# Patient Record
Sex: MALE | Race: WHITE | Employment: FULL TIME | URBAN - METROPOLITAN AREA
[De-identification: names, ages, dates, MRNs, and addresses within clinical notes are randomized per-mention and may not be internally consistent; named-entity substitution may affect disease eponyms.]

---

## 2017-08-11 ENCOUNTER — OFFICE VISIT (OUTPATIENT)
Dept: INTERNAL MEDICINE CLINIC | Age: 42
End: 2017-08-11

## 2017-08-11 VITALS
WEIGHT: 161.4 LBS | BODY MASS INDEX: 23.11 KG/M2 | HEIGHT: 70 IN | HEART RATE: 76 BPM | TEMPERATURE: 98.5 F | DIASTOLIC BLOOD PRESSURE: 82 MMHG | SYSTOLIC BLOOD PRESSURE: 127 MMHG | OXYGEN SATURATION: 96 %

## 2017-08-11 DIAGNOSIS — K64.9 HEMORRHOIDS, UNSPECIFIED HEMORRHOID TYPE: ICD-10-CM

## 2017-08-11 DIAGNOSIS — F41.9 ANXIETY: Primary | ICD-10-CM

## 2017-08-11 DIAGNOSIS — E05.90 HYPERTHYROIDISM: ICD-10-CM

## 2017-08-11 RX ORDER — ASPIRIN 325 MG
325 TABLET ORAL DAILY
COMMUNITY
End: 2017-09-06

## 2017-08-11 RX ORDER — HYDROCORTISONE 25 MG/G
CREAM TOPICAL 4 TIMES DAILY
Qty: 30 G | Refills: 0 | Status: SHIPPED | OUTPATIENT
Start: 2017-08-11 | End: 2018-02-01

## 2017-08-11 NOTE — PROGRESS NOTES
Chief Complaint   Patient presents with    Hemorrhoids     x 2 months    Anxiety     x 3 weeks    Thyroid Problem     Hyperthyroidism     he is a 46y.o. year old male who presents for evaluation of anxiety x 3 weeks. Anxiety and/or Depression  Well controlled on none and no other therapies. Ongoing symptoms include: shortness of breath, trouble focusing,   Patient denies: chest pain. Reported side effects from the treatment: none. Patient also states he has got recurrent hemorrhoids. He does not have a current flare, but has been taking fiber supplements with Gummies and trying over-the-counter creams with no relief. States he has these flareups every couple weeks and has a history of having a hemorrhoidectomy he had a colonoscopy done 6 years ago which was normal  Reviewed and agree with Nurse Note and duplicated in this note. Reviewed PmHx, RxHx, FmHx, SocHx, AllgHx and updated and dated in the chart.     Family History   Problem Relation Age of Onset    Thyroid Disease Mother      hyperthyroidism    Hypertension Father     Thyroid Disease Maternal Grandmother        Past Medical History:   Diagnosis Date    Fatigue 2/20/2012    Prostatitis       Social History     Social History    Marital status:      Spouse name: N/A    Number of children: N/A    Years of education: N/A     Social History Main Topics    Smoking status: Never Smoker    Smokeless tobacco: Never Used    Alcohol use Yes      Comment: 4-5 drinks per week    Drug use: No    Sexual activity: Yes     Partners: Female     Birth control/ protection: Surgical     Other Topics Concern    Not on file     Social History Narrative    No narrative on file        Review of Systems - negative except as listed above      Objective:     Vitals:    08/11/17 0942   BP: 127/82   Pulse: 76   Temp: 98.5 °F (36.9 °C)   TempSrc: Oral   SpO2: 96%   Weight: 161 lb 6.4 oz (73.2 kg)   Height: 5' 10\" (1.778 m)       Physical Examination: General appearance - alert, well appearing, and in no distress  Eyes - pupils equal and reactive, extraocular eye movements intact  Ears - bilateral TM's and external ear canals normal  Nose - normal and patent, no erythema, discharge or polyps  Mouth - mucous membranes moist, pharynx normal without lesions  Neck - supple, no significant adenopathy  Chest - clear to auscultation, no wheezes, rales or rhonchi, symmetric air entry  Heart - normal rate, regular rhythm, normal S1, S2, no murmurs, rubs, clicks or gallops  Abdomen - soft, nontender, nondistended, no masses or organomegaly  Neurological - alert, oriented, normal speech, no focal findings or movement disorder noted  Musculoskeletal - no joint tenderness, deformity or swelling  Extremities - peripheral pulses normal, no pedal edema, no clubbing or cyanosis  Skin - normal coloration and turgor, no rashes, no suspicious skin lesions noted    Assessment/ Plan:   Diagnoses and all orders for this visit:    1. Anxiety  -     REFERRAL TO PSYCHOLOGY    2. Hyperthyroidism  -     TSH 3RD GENERATION  -     T4, FREE  -     T3 TOTAL    3. Hemorrhoids, unspecified hemorrhoid type  -     hydrocortisone (ANUSOL-HC) 2.5 % rectal cream; Insert  into rectum four (4) times daily. Follow-up Disposition:  Return if symptoms worsen or fail to improve. I have discussed the diagnosis with the patient and the intended plan as seen in the above orders. The patient has received an after-visit summary and questions were answered concerning future plans. Medication Side Effects and Warnings were discussed with patient: yes  Patient Labs were reviewed and or requested: yes  Patient Past Records were reviewed and or requested  yes  I have discussed the diagnosis with the patient and the intended plan as seen in the above orders. The patient has received an after-visit summary and questions were answered concerning future plans.      Pt agrees to call or return to clinic and/or go to closest ER with any worsening of symptoms. This may include, but not limited to increased fever (>100.4) with NSAIDS or Tylenol, increased edema, confusion, rash, worsening of presenting symptoms. 1) Remember to stay active and/or exercise regularly (I suggest 30-45 minutes daily)   2) For reliable dietary information, go to www. EATRIGHT.org. You may wish to consider seeing the nutritionist at [facility] at #[phone] or [phone], also consider the [phone] [facility].   3) I routinely suggest a complete physical exam once each year (your birth month)

## 2017-08-11 NOTE — MR AVS SNAPSHOT
Visit Information Date & Time Provider Department Dept. Phone Encounter #  
 8/11/2017  9:30 AM Hunter Jones MD [facility] Medicine and [place] [id number] Follow-up Instructions Return if symptoms worsen or fail to improve. Follow-up and Disposition History Upcoming Health Maintenance Date Due DTaP/Tdap/Td series (1 - Tdap) 9/10/1998 INFLUENZA AGE 9 TO ADULT 8/1/2017 Allergies as of 8/11/2017  Review Complete On: 8/11/2017 By: Hunter Jones MD  
  
 Severity Noted Reaction Type Reactions Hydrocodone  02/20/2012   Side Effect Nausea and Vomiting Current Immunizations  Never Reviewed No immunizations on file. Not reviewed this visit You Were Diagnosed With   
  
 Codes Comments Anxiety    -  Primary ICD-10-CM: F41.9 ICD-9-CM: 300.00 Hyperthyroidism     ICD-10-CM: E05.90 ICD-9-CM: 242.90 Hemorrhoids, unspecified hemorrhoid type     ICD-10-CM: K64.9 ICD-9-CM: 084. 6 Vitals BP Pulse Temp Height(growth percentile) Weight(growth percentile) SpO2  
 127/82 (BP 1 Location: Left arm, BP Patient Position: Sitting) 76 98.5 °F (36.9 °C) (Oral) 5' 10\" (1.778 m) 161 lb 6.4 oz (73.2 kg) 96% BMI Smoking Status 23.16 kg/m2 Never Smoker Vitals History BMI and BSA Data Body Mass Index Body Surface Area  
 23.16 kg/m 2 1.9 m 2 Preferred Pharmacy Pharmacy Name Phone [id number] [facility], [address] [place] [address] Your Updated Medication List  
  
   
This list is accurate as of: 8/11/17 10:26 AM.  Always use your most recent med list.  
  
  
  
  
 aspirin 325 mg tablet Commonly known as:  ASPIRIN Take 325 mg by mouth daily. hydrocortisone 2.5 % rectal cream  
Commonly known as:  ANUSOL-HC Insert  into rectum four (4) times daily. zolpidem 10 mg tablet Commonly known as:  AMBIEN  
 Take 1 Tab by mouth nightly as needed for Sleep. Prescriptions Sent to Pharmacy Refills  
 hydrocortisone (ANUSOL-HC) 2.5 % rectal cream 0 Sig: Insert  into rectum four (4) times daily. Class: Normal  
 [facility]acy: [facility] [address], [address] [place] [address] #: [phone] Route: Rectal  
  
We Performed the Following REFERRAL TO PSYCHOLOGY [MGI07 Custom] Comments:  
 Please evaluate patient for anxiety. T3 TOTAL [id number] CPT(R)] T4, FREE U735379 CPT(R)] TSH 3RD GENERATION [47421 CPT(R)] Follow-up Instructions Return if symptoms worsen or fail to improve. Referral Information Referral ID Referred By Referred To  
  
 [id number] Gilma HAM Not Available Visits Status Start Date End Date 1 New Request 8/11/17 8/11/18 If your referral has a status of pending review or denied, additional information will be sent to support the outcome of this decision. Patient Instructions Metamucil daily Tucks pads after each bowel movement Introducing [facility] & HEALTH SERVICES! Dear Leah Choudhury: Thank you for requesting a reBounces account. Our records indicate that you have previously registered for a reBounces account but its currently inactive. Please call our reBounces support line at [phone]. Additional Information If you have questions, please visit the Frequently Asked Questions section of the reBounces website at [email or web address] Remember, reBounces is NOT to be used for urgent needs. For medical emergencies, dial 911. Now available from your iPhone and Android! Please provide this summary of care documentation to your next provider. Your primary care clinician is listed as Tonia Hastings. If you have any questions after today's visit, please call [phone].

## 2017-08-12 DIAGNOSIS — E05.90 HYPERTHYROIDISM: Primary | ICD-10-CM

## 2017-08-12 LAB
T3 SERPL-MCNC: 125 NG/DL (ref 71–180)
T4 FREE SERPL-MCNC: 1.54 NG/DL (ref 0.82–1.77)
TSH SERPL DL<=0.005 MIU/L-ACNC: 0.41 UIU/ML (ref 0.45–4.5)

## 2017-08-16 ENCOUNTER — TELEPHONE (OUTPATIENT)
Dept: INTERNAL MEDICINE CLINIC | Age: 42
End: 2017-08-16

## 2017-08-16 NOTE — TELEPHONE ENCOUNTER
----- Message from Milo Links sent at 8/15/2017  9:18 AM EDT -----  The patient is requesting a call back regarding his thyroid test results. Thanks!

## 2017-08-16 NOTE — TELEPHONE ENCOUNTER
This writer contact patient in reference to recent lab results. This writer informed patient per result letter, per physician thyroid level (TSH) was slightly low and per physician recommendation advised patient physician will submitted a referral to endocrinology for further evaluation. Patient verbalized understanding and states he has an appointment scheduled with endocrine in October. No further questions or concerns voiced.

## 2017-09-06 ENCOUNTER — OFFICE VISIT (OUTPATIENT)
Dept: ENDOCRINOLOGY | Age: 42
End: 2017-09-06

## 2017-09-06 VITALS
HEART RATE: 67 BPM | SYSTOLIC BLOOD PRESSURE: 113 MMHG | WEIGHT: 165 LBS | DIASTOLIC BLOOD PRESSURE: 78 MMHG | HEIGHT: 70 IN | BODY MASS INDEX: 23.62 KG/M2

## 2017-09-06 DIAGNOSIS — E05.90 SUBCLINICAL HYPERTHYROIDISM: Primary | ICD-10-CM

## 2017-09-06 RX ORDER — HYDROCORTISONE 25 MG/G
CREAM TOPICAL
Refills: 0 | COMMUNITY
Start: 2017-08-12 | End: 2017-09-06

## 2017-09-06 NOTE — MR AVS SNAPSHOT
Visit Information Date & Time Provider Department Dept. Phone Encounter #  
 9/6/2017  9:50 AM Ragini Escamilla, [facility] [phone] [id number] Follow-up Instructions Return in about 3 months (around 12/6/2017). Upcoming Health Maintenance Date Due DTaP/Tdap/Td series (1 - Tdap) 9/10/1998 INFLUENZA AGE 9 TO ADULT 8/1/2017 Allergies as of 9/6/2017  Review Complete On: 9/6/2017 By: Ragini Escamilla MD  
  
 Severity Noted Reaction Type Reactions Hydrocodone  02/20/2012   Side Effect Nausea and Vomiting Current Immunizations  Never Reviewed No immunizations on file. Not reviewed this visit You Were Diagnosed With   
  
 Codes Comments Subclinical hyperthyroidism    -  Primary ICD-10-CM: E05.90 ICD-9-CM: 242.90 Vitals BP Pulse Height(growth percentile) Weight(growth percentile) BMI Smoking Status 113/78 67 5' 10\" (1.778 m) 165 lb (74.8 kg) 23.68 kg/m2 Never Smoker Vitals History BMI and BSA Data Body Mass Index Body Surface Area  
 23.68 kg/m 2 1.92 m 2 Preferred Pharmacy Pharmacy Name Phone [id number], [place] - [address] [phone] Your Updated Medication List  
  
   
This list is accurate as of: 9/6/17 10:33 AM.  Always use your most recent med list.  
  
  
  
  
 hydrocortisone 2.5 % rectal cream  
Commonly known as:  ANUSOL-HC Insert  into rectum four (4) times daily. zolpidem 10 mg tablet Commonly known as:  AMBIEN Take 1 Tab by mouth nightly as needed for Sleep. We Performed the Following NE COLLECTION VENOUS BLOOD,VENIPUNCTURE L3801304 CPT(R)] SPECIMEN HANDLING, OFF->LAB [id number] CPT(R)] THYROID ANTIBODY PANEL [12406 CPT(R)] THYROID STIMULATING IMMUNOGLOBULIN K3426435 CPT(R)] TSH RECEPTOR AB [72476 CPT(R)] Follow-up Instructions Return in about 3 months (around 12/6/2017). Patient Instructions Hyperthyroidism: Care Instructions Your Care Instructions Hyperthyroidism occurs when the thyroid gland makes too much thyroid hormone. This speeds up your metabolismhow your body uses energy. This condition can cause you to be very active, lose weight, and have sleep problems, eye problems, and a fast heart rate. It can also cause a goiter. A goiter is an enlarged thyroid gland that you can see at the front of the neck. Hyperthyroidism is often caused by Graves disease. In Graves' disease, the body's defense (immune) system attacks the thyroid gland. Your doctor may prescribe a beta-blocker medicine to slow your pulse and calm you down. But this is not a treatment for hyperthyroidism. It is given for your fast heart rate. Your doctor may also give you antithyroid medicine. This medicine keeps excess thyroid hormone in check. In some cases, doctors recommend radioactive iodine or surgery to remove the thyroid. After either of these treatments, you may need to take medicine to replace thyroid hormone for the rest of your life. Follow-up care is a key part of your treatment and safety. Be sure to make and go to all appointments, and call your doctor if you are having problems. Its also a good idea to know your test results and keep a list of the medicines you take. How can you care for yourself at home? · Take your medicines exactly as prescribed. You need to take the thyroid medicine at the same time each day. Call your doctor if you think you are having a problem with your medicine. · Graves' disease can make your eyes sore. Use artificial tears, eye drops, and sunglasses to protect your eyes from dryness, wind, and sun. Raise your head with pillows at night to prevent your eyes from swelling. In some cases, taping your eyelids shut at night will keep your eyes from being dry in the morning. · Make sure you get enough calcium. Foods that are rich in calcium include milk, yogurt, cheese, and dark green vegetables. · If you need to gain weight, ask your doctor about special diets. · Do not eat kelp. Gwendlyn Areola is high in iodine, which can make hyperthyroidism worse. Gwendlyn Areola is commonly used in Electrochaea and other [employer] foods. You can use iodized salt and eat bread and seafood. Try to eat a balanced diet. · Do not use caffeine and other stimulants. These can make symptoms worse, such as a fast heartbeat, nervousness, and problems focusing. · Do not smoke. Smoking can make your condition worse and may lead to more serious eye problems. If you need help quitting, talk to your doctor about stop-smoking programs and medicines. These can increase your chances of quitting for good. · Lower your stress. Learn to use biofeedback, guided imagery, meditation, or other methods to relax. · Use creams or ointments for irritated skin. Ask your doctor which type to use. · Tell all your doctors about your condition. They need to know because some medicines contain iodine. When should you call for help? Call your doctor now or seek immediate medical care if: 
· You have symptoms of a sudden, very high thyroid level (thyroid storm). These include: ¨ Being nauseated, vomiting, and having diarrhea. ¨ Sweating a lot. ¨ Feeling extremely restless and confused. ¨ Having a high fever. ¨ Having a fast heartbeat. · You have sudden vision changes or eye pain. · You have a fever or severe sore throat and are taking antithyroid medicines, such as PTU or methimazole. Watch closely for changes in your health, and be sure to contact your doctor if: 
· You have a sore throat or have problems swallowing. · You have swollen, itchy, or red eyes or your other eye symptoms get worse, or you have new vision problems. · You have signs of a low thyroid level (hypothyroidism). You may feel very tired, confused, or weak. Where can you learn more? Go to http://taylor-ki.info/. Enter K833 in the search box to learn more about \"Hyperthyroidism: Care Instructions. \" Current as of: July 28, 2016 Content Version: 11.3 © 2326-4252 Smart Device Media, Incorporated. Care instructions adapted under license by An[employer] (which disclaims liability or warranty for this information). If you have questions about a medical condition or this instruction, always ask your healthcare professional. [address] any warranty or liability for your use of this information. Introducing [facility] & HEALTH SERVICES! Dear Janessa Rain: Thank you for requesting a Stocard account. Our records indicate that you already have an active Stocard account. You can access your account anytime at [email or web address] Did you know that you can access your hospital and ER discharge instructions at any time in Stocard? You can also review all of your test results from your hospital stay or ER visit. Additional Information If you have questions, please visit the Frequently Asked Questions section of the Stocard website at [email or web address] Remember, Stocard is NOT to be used for urgent needs. For medical emergencies, dial 911. Now available from your iPhone and Android! Please provide this summary of care documentation to your next provider. Your primary care clinician is listed as [address] [place] [address] . If you have any questions after today's visit, please call [phone].

## 2017-09-06 NOTE — PATIENT INSTRUCTIONS
Hyperthyroidism: Care Instructions  Your Care Instructions  Hyperthyroidism occurs when the thyroid gland makes too much thyroid hormone. This speeds up your metabolism--how your body uses energy. This condition can cause you to be very active, lose weight, and have sleep problems, eye problems, and a fast heart rate. It can also cause a goiter. A goiter is an enlarged thyroid gland that you can see at the front of the neck. Hyperthyroidism is often caused by Graves disease. In Graves' disease, the body's defense (immune) system attacks the thyroid gland. Your doctor may prescribe a beta-blocker medicine to slow your pulse and calm you down. But this is not a treatment for hyperthyroidism. It is given for your fast heart rate. Your doctor may also give you antithyroid medicine. This medicine keeps excess thyroid hormone in check. In some cases, doctors recommend radioactive iodine or surgery to remove the thyroid. After either of these treatments, you may need to take medicine to replace thyroid hormone for the rest of your life. Follow-up care is a key part of your treatment and safety. Be sure to make and go to all appointments, and call your doctor if you are having problems. Its also a good idea to know your test results and keep a list of the medicines you take. How can you care for yourself at home? · Take your medicines exactly as prescribed. You need to take the thyroid medicine at the same time each day. Call your doctor if you think you are having a problem with your medicine. · Graves' disease can make your eyes sore. Use artificial tears, eye drops, and sunglasses to protect your eyes from dryness, wind, and sun. Raise your head with pillows at night to prevent your eyes from swelling. In some cases, taping your eyelids shut at night will keep your eyes from being dry in the morning. · Make sure you get enough calcium.  Foods that are rich in calcium include milk, yogurt, cheese, and dark green vegetables. · If you need to gain weight, ask your doctor about special diets. · Do not eat kelp. Jeny Tripathi is high in iodine, which can make hyperthyroidism worse. Jeny Tripathi is commonly used in [employer] and other [place] foods. You can use iodized salt and eat bread and seafood. Try to eat a balanced diet. · Do not use caffeine and other stimulants. These can make symptoms worse, such as a fast heartbeat, nervousness, and problems focusing. · Do not smoke. Smoking can make your condition worse and may lead to more serious eye problems. If you need help quitting, talk to your doctor about stop-smoking programs and medicines. These can increase your chances of quitting for good. · Lower your stress. Learn to use biofeedback, guided imagery, meditation, or other methods to relax. · Use creams or ointments for irritated skin. Ask your doctor which type to use. · Tell all your doctors about your condition. They need to know because some medicines contain iodine. When should you call for help? Call your doctor now or seek immediate medical care if:  · You have symptoms of a sudden, very high thyroid level (thyroid storm). These include:  ¨ Being nauseated, vomiting, and having diarrhea. ¨ Sweating a lot. ¨ Feeling extremely restless and confused. ¨ Having a high fever. ¨ Having a fast heartbeat. · You have sudden vision changes or eye pain. · You have a fever or severe sore throat and are taking antithyroid medicines, such as PTU or methimazole. Watch closely for changes in your health, and be sure to contact your doctor if:  · You have a sore throat or have problems swallowing. · You have swollen, itchy, or red eyes or your other eye symptoms get worse, or you have new vision problems. · You have signs of a low thyroid level (hypothyroidism). You may feel very tired, confused, or weak. Where can you learn more? Go to http://taylor-ki.info/.   Enter I271 in the search box to learn more about \"Hyperthyroidism: Care Instructions. \"  Current as of: July 28, 2016  Content Version: 11.3  © 1725-5208 Monitor My Meds, Leapforce. Care instructions adapted under license by Blue Rooster (which disclaims liability or warranty for this information). If you have questions about a medical condition or this instruction, always ask your healthcare professional. [place] [address] any warranty or liability for your use of this information.

## 2017-09-06 NOTE — LETTER
9/6/2017 10:26 AM 
 
Patient:  Tammy Mackenzie YOB: 1975 Date of Visit: 9/6/2017 Dear Zipporah Lanes, MD 
[facility] [address] [id number] VIA In Basket 
 : Thank you for referring Mr. Tammy Mackenzie to me for evaluation/treatment. Below are the relevant portions of my assessment and plan of care. If you have questions, please do not hesitate to call me. I look forward to following Mr. Emani Rodriguez along with you. Sincerely, Elvia Harvey MD

## 2017-09-06 NOTE — PROGRESS NOTES
Chief Complaint   Patient presents with    Thyroid Problem     pcp and pharmacy verified   Records reviewed  History of Present Illness: Lesly Suarez is a 46 y.o. male who I was asked to see in consult by Dr. Apoorva Campoverde, for evaluation of hyperthyroidism. He was originally diagnosed with \"subclinical Hyperthyroidism\" 4-5 years ago and in 2013 he was seen by my partner Dr. Mckenzie Kemp. He was never started on MMI or anti-thyroid medications at the time as he was asymptomatic. He had a normal thyroid US and his U&S was 30% with no hot or cold nodules. Pt notes he has a strong family hx of thyroid issues. He note his mother had \"hyperthyroidism till her thyroid gland gave up and she become hypothyroid\". His MGM was never diagnosed till she was near death but she was diagnosed with \"thyroid issues\" and his MUncle had hyperthyroidism as well. Pt has no personal hx of cancer, his PGM had \"cancer of some kind\". In August he presented to his PCP with issues of anxiety for about 3 weeks and as part of the work-up he was found to have TSH of 0.414, FT4 of 1.54 and TT3 of 125. He reports he does get issues of palpitations, about once per month, he denies CP or SOB. \"I can't hold my hands still to save my life, but I have that this my entire adult life\". He notes issues of constipation. He notes \"I am cold natured\". He denies any issues of weight change up or down. He denies issues of changes in his hair, nails, skin or neck. He denies issues of dysphagia, dysphonia or chocking issues.       Past Medical History:   Diagnosis Date    Fatigue 2/20/2012    Prostatitis      Past Surgical History:   Procedure Laterality Date    HX ADENOIDECTOMY      HX HERNIA REPAIR  2008    HX RHINOPLASTY      deviated septum    HX VASECTOMY  2002    MO COLONOSCOPY FLX DX W/COLLJ SPEC WHEN PFRMD  2011    repeat age 50     Current Outpatient Prescriptions   Medication Sig    hydrocortisone (ANUSOL-HC) 2.5 % rectal cream Insert  into rectum four (4) times daily.  zolpidem (AMBIEN) 10 mg tablet Take 1 Tab by mouth nightly as needed for Sleep. No current facility-administered medications for this visit. Allergies   Allergen Reactions    Hydrocodone Nausea and Vomiting     Family History   Problem Relation Age of Onset    Thyroid Disease Mother      hyperthyroidism    Diabetes Mother     Seizures Mother     Hypertension Father     Heart Disease Father     Psychiatric Disorder Brother     No Known Problems Brother     Thyroid Disease Maternal Grandmother     Thyroid Disease Maternal Uncle     Cancer Paternal Grandmother      Unknown     Social History     Social History    Marital status:      Spouse name: N/A    Number of children: N/A    Years of education: N/A     Occupational History    Not on file. Social History Main Topics    Smoking status: Never Smoker    Smokeless tobacco: Never Used    Alcohol use Yes      Comment: 2-4 per week    Drug use: No    Sexual activity: Yes     Partners: Female     Birth control/ protection: Surgical     Other Topics Concern    Not on file     Social History Narrative     Review of Systems:  - Constitutional Symptoms: no fevers, chills, weight loss  - Eyes: no blurry vision or double vision  - Cardiovascular: no chest pain or palpitations  - Respiratory: no cough or shortness of breath  - Gastrointestinal: no dysphagia or abdominal pain  - Musculoskeletal: no joint pains or weakness  - Integumentary: no rashes  - Neurological: no numbness, tingling, or headaches  - Psychiatric: no depression or anxiety  - Endocrine: no heat or cold intolerance, no polyuria or polydipsia    Physical Examination:  Blood pressure 113/78, pulse 67, height 5' 10\" (1.778 m), weight 165 lb (74.8 kg).   - General: pleasant, no distress, good eye contact  - HEENT: no exopthalmos, no periorbital edema, no scleral/conjunctival injection, EOMI, no lid lag or stare  - Neck: supple, no thyromegaly, masses, lymph nodes, or carotid bruits, no supraclavicular or dorsocervical fat pads  - Cardiovascular: regular, normal rate, normal S1 and S2, no murmurs/rubs/gallops   - Respiratory: clear to auscultation bilaterally  - Gastrointestinal: soft, nontender, nondistended, no masses, no hepatosplenomegaly  - Musculoskeletal: no proximal muscle weakness in upper or lower extremities  - Integumentary: no acanthosis nigricans, no abdominal striae, no rashes, no edema  - Neurological: reflexes 2+ at biceps, no tremor  - Psychiatric: normal mood and affect    Data Reviewed:   REPORT:  EXAM:  THYROID PARATHYROID - US    INDICATION:    hyperthyroidism     COMPARISON: None. FINDINGS: Real-time sonography of the thyroid gland was performed with a   high frequency linear transducer. Multiple static images were obtained. The thyroid is homogeneous in echotexture with no mass, nodule or other   abnormality. The right lobe measures 6.4 x 2.3 x 2.2 cm and the left lobe measures 6.4 x   2.1 x 2.1cm. The isthmus measures 4.8 mm.        IMPRESSION:  Normal ultrasound examination of the thyroid. REPORT:  EXAM: Nuclear thyroid scan and uptake are obtained 24 hours following p.o.   administration of 297 uCi Iodine 123. INDICATION:  Thyrotoxicosis without mention  Mild hyperthyroidism and   questionable asymmetry of gland     FINDINGS: Uptake is 30% (normal: 10% - 30%). Images of the gland in 3 projections show symmetric appearance and uniform   uptake with no hot or cold nodules.        IMPRESSION:   1. Top normal 24 uptake. 2. No hot or cold nodules.      Component      Latest Ref Rng & Units 8/11/2017 8/11/2017 8/11/2017          10:19 AM 10:19 AM 10:19 AM   TSH      0.450 - 4.500 uIU/mL   0.414 (L)   T4, Free      0.82 - 1.77 ng/dL  1.54    T3, total      71 - 180 ng/dL 125         Assessment/Plan:   1.  Subclinical hyperthyroidism    1) In August his TSH was mildly suppressed, but this is how his labs looked in 2012 as well. My partner Dr. Regulo Knight previously checked Thyroid US, which was normal and he had a borderline high U&S. Today will check TSI, TRAb, and Thyroid Ab levels to look for an etiology of his SH. Pt has symptoms of occasional insomnia and anxiety, which could be due to hyperthyroidism, or could be unrelated. We discussed treatment options including MMI, BETANCOURT and thyroidectomy. For now I don't feel pt need treatment/therapy and we can monitor his levels only. If pt's symptoms of anxiety and insomnia worsen or if he develops other symptoms of hyperthyroidism the I feel he would warrant therapy. I am not going to repeat the US or U&S at this time, but there is a clinical change or his TFTs change then I feel repeating the U&S would be warranted. Pt voices understanding and agreement with the plan. RTC 3 months. Patient Instructions        Hyperthyroidism: Care Instructions  Your Care Instructions  Hyperthyroidism occurs when the thyroid gland makes too much thyroid hormone. This speeds up your metabolism--how your body uses energy. This condition can cause you to be very active, lose weight, and have sleep problems, eye problems, and a fast heart rate. It can also cause a goiter. A goiter is an enlarged thyroid gland that you can see at the front of the neck. Hyperthyroidism is often caused by Graves disease. In Graves' disease, the body's defense (immune) system attacks the thyroid gland. Your doctor may prescribe a beta-blocker medicine to slow your pulse and calm you down. But this is not a treatment for hyperthyroidism. It is given for your fast heart rate. Your doctor may also give you antithyroid medicine. This medicine keeps excess thyroid hormone in check. In some cases, doctors recommend radioactive iodine or surgery to remove the thyroid. After either of these treatments, you may need to take medicine to replace thyroid hormone for the rest of your life.   Follow-up care is a key part of your treatment and safety. Be sure to make and go to all appointments, and call your doctor if you are having problems. Its also a good idea to know your test results and keep a list of the medicines you take. How can you care for yourself at home? · Take your medicines exactly as prescribed. You need to take the thyroid medicine at the same time each day. Call your doctor if you think you are having a problem with your medicine. · Graves' disease can make your eyes sore. Use artificial tears, eye drops, and sunglasses to protect your eyes from dryness, wind, and sun. Raise your head with pillows at night to prevent your eyes from swelling. In some cases, taping your eyelids shut at night will keep your eyes from being dry in the morning. · Make sure you get enough calcium. Foods that are rich in calcium include milk, yogurt, cheese, and dark green vegetables. · If you need to gain weight, ask your doctor about special diets. · Do not eat kelp. Miah Divers is high in iodine, which can make hyperthyroidism worse. Miah Divers is commonly used in [employer] and other [employer] foods. You can use iodized salt and eat bread and seafood. Try to eat a balanced diet. · Do not use caffeine and other stimulants. These can make symptoms worse, such as a fast heartbeat, nervousness, and problems focusing. · Do not smoke. Smoking can make your condition worse and may lead to more serious eye problems. If you need help quitting, talk to your doctor about stop-smoking programs and medicines. These can increase your chances of quitting for good. · Lower your stress. Learn to use biofeedback, guided imagery, meditation, or other methods to relax. · Use creams or ointments for irritated skin. Ask your doctor which type to use. · Tell all your doctors about your condition. They need to know because some medicines contain iodine. When should you call for help?   Call your doctor now or seek immediate medical care if:  · You have symptoms of a sudden, very high thyroid level (thyroid storm). These include:  ¨ Being nauseated, vomiting, and having diarrhea. ¨ Sweating a lot. ¨ Feeling extremely restless and confused. ¨ Having a high fever. ¨ Having a fast heartbeat. · You have sudden vision changes or eye pain. · You have a fever or severe sore throat and are taking antithyroid medicines, such as PTU or methimazole. Watch closely for changes in your health, and be sure to contact your doctor if:  · You have a sore throat or have problems swallowing. · You have swollen, itchy, or red eyes or your other eye symptoms get worse, or you have new vision problems. · You have signs of a low thyroid level (hypothyroidism). You may feel very tired, confused, or weak. Where can you learn more? Go to http://taylor-ki.info/. Enter V737 in the search box to learn more about \"Hyperthyroidism: Care Instructions. \"  Current as of: July 28, 2016  Content Version: 11.3  © 8222-1872 [employer]. Care instructions adapted under license by [employer] (which disclaims liability or warranty for this information). If you have questions about a medical condition or this instruction, always ask your healthcare professional. [place] [address] any warranty or liability for your use of this information. Follow-up Disposition:  Return in about 3 months (around 12/6/2017).     Copy sent to:  Dr. Kyle Manriquez

## 2017-09-11 LAB
THYROGLOB AB SERPL-ACNC: <1 IU/ML (ref 0–0.9)
THYROPEROXIDASE AB SERPL-ACNC: 17 IU/ML (ref 0–34)
TSH RECEP AB SER-ACNC: <0.5 IU/L (ref 0–1.75)
TSI ACT/NOR SER: 31 % (ref 0–139)

## 2017-09-11 NOTE — PROGRESS NOTES
Thyroid TSI, TRAb and Thyroid Ab panel are all normal.  Pt is clinically euthyroid, I don't see any reasons to start him on an anti-thyroid regimen at this time

## 2017-12-29 ENCOUNTER — TELEPHONE (OUTPATIENT)
Dept: INTERNAL MEDICINE CLINIC | Age: 42
End: 2017-12-29

## 2017-12-29 DIAGNOSIS — K64.9 HEMORRHOIDS, UNSPECIFIED HEMORRHOID TYPE: Primary | ICD-10-CM

## 2017-12-29 NOTE — TELEPHONE ENCOUNTER
I called patient after I spoke with Dr. Dennise Harrison. Dr. Dennise Harrison is going to send in refills of hemorrhoid cream and refer him to a GI doctor to see if surgery is the next step. Patient agreed with plan.

## 2018-02-01 ENCOUNTER — OFFICE VISIT (OUTPATIENT)
Dept: ENDOCRINOLOGY | Age: 43
End: 2018-02-01

## 2018-02-01 VITALS
HEART RATE: 75 BPM | BODY MASS INDEX: 22.65 KG/M2 | SYSTOLIC BLOOD PRESSURE: 126 MMHG | HEIGHT: 70 IN | WEIGHT: 158.2 LBS | DIASTOLIC BLOOD PRESSURE: 76 MMHG

## 2018-02-01 DIAGNOSIS — E05.90 SUBCLINICAL HYPERTHYROIDISM: Primary | ICD-10-CM

## 2018-02-01 NOTE — PROGRESS NOTES
Chief Complaint   Patient presents with    Thyroid Problem     pcp and pharmacy verified   Records since last visit reviewed. History of Present Illness: Moises Dickinson is a 38 y.o. male here for follow up of low TSH with normal T3/T4. He was originally diagnosed with \"subclinical Hyperthyroidism\" 4-5 years ago and in 2013 he was seen by my partner Dr. Rosario Selby. He was never started on MMI or anti-thyroid medications at the time as he was asymptomatic. He had a normal thyroid US and his U&S was 30% with no hot or cold nodules. In August 2017 he presented to his PCP with issues of anxiety for about 3 weeks and as part of the work-up he was found to have TSH of 0.414, FT4 of 1.54 and TT3 of 125. In September 2017 I ordered TRAb, TPO, TgAb and TSI, all of which were negative. Since he was clinically euthyroid and had no obvious etiologies of hyperthyroidism I did not feel he warranted treatment for hyperthyroidism. \"I have been feeling a lot better. \" He notes his energy levels are much improved. He was recently diagnosed with an anal fissure and is being treated for this. He denies palpitations, CP or SOB. He is sleeping better and has cut back on etoh and caffiene. He notes issues of constipation. He notes \"I am cold natured\". He denies any issues of weight change up or down. He denies issues of changes in his hair, nails, skin or neck. He denies issues of dysphagia, dysphonia or chocking issues. Current Outpatient Prescriptions   Medication Sig    nitroglycerin (RECTIV) 0.4 % (w/w) ointment Insert  into rectum every twelve (12) hours every twelve (12) hours. No current facility-administered medications for this visit.       Allergies   Allergen Reactions    Hydrocodone Nausea and Vomiting    Levaquin [Levofloxacin] Other (comments)     Panic attacks     Review of Systems:  - Cardiovascular: no chest pain  - Neurological: no tremors  - Integumentary: skin is normal    Physical Examination:  Blood pressure 126/76, pulse 75, height 5' 10\" (1.778 m), weight 158 lb 3.2 oz (71.8 kg). - General: pleasant, no distress, good eye contact   - Neck: no thyromegaly or thyroid bruits  - Cardiovascular: regular, normal rate, nl s1 and s2, no m/r/g   - Integumentary: skin is normal, no edema  - Neurological: reflexes 2+ at biceps, no tremors  - Psychiatric: normal mood and affect    Data Reviewed:   - none new for review    Assessment/Plan:   1) Subclinical hyperthyroidism > Pt is clinically euthyroid. His previous symptoms have resolved. Will check TFTs today and if they are ok, he does not need further follow up with me at this time. Pt voices understanding and agreement with the plan.   Pain noted and pt was recommended to call his PCP for further evaluation and treatment, as needed    Copy sent to:  Dr. Katelyn Gaytan

## 2018-02-02 LAB
T3 SERPL-MCNC: 127 NG/DL (ref 71–180)
T4 FREE SERPL-MCNC: 1.71 NG/DL (ref 0.82–1.77)
TSH SERPL DL<=0.005 MIU/L-ACNC: 0.5 UIU/ML (ref 0.45–4.5)

## 2018-08-21 ENCOUNTER — ANESTHESIA EVENT (OUTPATIENT)
Dept: SURGERY | Age: 43
End: 2018-08-21
Payer: COMMERCIAL

## 2018-08-21 ENCOUNTER — ANESTHESIA (OUTPATIENT)
Dept: SURGERY | Age: 43
End: 2018-08-21
Payer: COMMERCIAL

## 2018-08-21 ENCOUNTER — HOSPITAL ENCOUNTER (OUTPATIENT)
Age: 43
Setting detail: OUTPATIENT SURGERY
Discharge: HOME | End: 2018-08-21
Attending: COLON & RECTAL SURGERY | Admitting: COLON & RECTAL SURGERY
Payer: COMMERCIAL

## 2018-08-21 VITALS
HEIGHT: 71 IN | DIASTOLIC BLOOD PRESSURE: 60 MMHG | SYSTOLIC BLOOD PRESSURE: 108 MMHG | OXYGEN SATURATION: 98 % | RESPIRATION RATE: 12 BRPM | BODY MASS INDEX: 23.1 KG/M2 | WEIGHT: 165 LBS | HEART RATE: 75 BPM | TEMPERATURE: 97.6 F

## 2018-08-21 DIAGNOSIS — G89.18 ACUTE POST-OPERATIVE PAIN: Primary | ICD-10-CM

## 2018-08-21 PROCEDURE — 77030031139 HC SUT VCRL2 J&J -A: Performed by: COLON & RECTAL SURGERY

## 2018-08-21 PROCEDURE — 77030026438 HC STYL ET INTUB CARD -A: Performed by: NURSE ANESTHETIST, CERTIFIED REGISTERED

## 2018-08-21 PROCEDURE — 77030032490 HC SLV COMPR SCD KNE COVD -B: Performed by: COLON & RECTAL SURGERY

## 2018-08-21 PROCEDURE — 76210000020 HC REC RM PH II FIRST 0.5 HR: Performed by: COLON & RECTAL SURGERY

## 2018-08-21 PROCEDURE — 77030008684 HC TU ET CUF COVD -B: Performed by: NURSE ANESTHETIST, CERTIFIED REGISTERED

## 2018-08-21 PROCEDURE — 77030020782 HC GWN BAIR PAWS FLX 3M -B

## 2018-08-21 PROCEDURE — 76210000017 HC OR PH I REC 1.5 TO 2 HR: Performed by: COLON & RECTAL SURGERY

## 2018-08-21 PROCEDURE — 77030013079 HC BLNKT BAIR HGGR 3M -A: Performed by: NURSE ANESTHETIST, CERTIFIED REGISTERED

## 2018-08-21 PROCEDURE — 76060000033 HC ANESTHESIA 1 TO 1.5 HR: Performed by: COLON & RECTAL SURGERY

## 2018-08-21 PROCEDURE — 74011250636 HC RX REV CODE- 250/636

## 2018-08-21 PROCEDURE — 74011250636 HC RX REV CODE- 250/636: Performed by: ANESTHESIOLOGY

## 2018-08-21 PROCEDURE — 74011000250 HC RX REV CODE- 250: Performed by: COLON & RECTAL SURGERY

## 2018-08-21 PROCEDURE — 76010000149 HC OR TIME 1 TO 1.5 HR: Performed by: COLON & RECTAL SURGERY

## 2018-08-21 PROCEDURE — 88304 TISSUE EXAM BY PATHOLOGIST: CPT | Performed by: COLON & RECTAL SURGERY

## 2018-08-21 PROCEDURE — 77030014007 HC SPNG HEMSTAT J&J -B: Performed by: COLON & RECTAL SURGERY

## 2018-08-21 PROCEDURE — 74011000250 HC RX REV CODE- 250

## 2018-08-21 PROCEDURE — 74011250637 HC RX REV CODE- 250/637: Performed by: COLON & RECTAL SURGERY

## 2018-08-21 PROCEDURE — 77030011640 HC PAD GRND REM COVD -A: Performed by: COLON & RECTAL SURGERY

## 2018-08-21 PROCEDURE — 77030018836 HC SOL IRR NACL ICUM -A: Performed by: COLON & RECTAL SURGERY

## 2018-08-21 RX ORDER — GLYCOPYRROLATE 0.2 MG/ML
INJECTION INTRAMUSCULAR; INTRAVENOUS AS NEEDED
Status: DISCONTINUED | OUTPATIENT
Start: 2018-08-21 | End: 2018-08-21 | Stop reason: HOSPADM

## 2018-08-21 RX ORDER — SODIUM CHLORIDE 0.9 % (FLUSH) 0.9 %
5-10 SYRINGE (ML) INJECTION AS NEEDED
Status: DISCONTINUED | OUTPATIENT
Start: 2018-08-21 | End: 2018-08-21 | Stop reason: HOSPADM

## 2018-08-21 RX ORDER — SODIUM CHLORIDE, SODIUM LACTATE, POTASSIUM CHLORIDE, CALCIUM CHLORIDE 600; 310; 30; 20 MG/100ML; MG/100ML; MG/100ML; MG/100ML
1000 INJECTION, SOLUTION INTRAVENOUS CONTINUOUS
Status: DISCONTINUED | OUTPATIENT
Start: 2018-08-21 | End: 2018-08-21 | Stop reason: HOSPADM

## 2018-08-21 RX ORDER — LIDOCAINE HYDROCHLORIDE 10 MG/ML
0.1 INJECTION, SOLUTION EPIDURAL; INFILTRATION; INTRACAUDAL; PERINEURAL AS NEEDED
Status: DISCONTINUED | OUTPATIENT
Start: 2018-08-21 | End: 2018-08-21 | Stop reason: HOSPADM

## 2018-08-21 RX ORDER — FENTANYL CITRATE 50 UG/ML
25 INJECTION, SOLUTION INTRAMUSCULAR; INTRAVENOUS
Status: DISCONTINUED | OUTPATIENT
Start: 2018-08-21 | End: 2018-08-21 | Stop reason: HOSPADM

## 2018-08-21 RX ORDER — OXYCODONE HYDROCHLORIDE 5 MG/1
5 TABLET ORAL AS NEEDED
Status: DISCONTINUED | OUTPATIENT
Start: 2018-08-21 | End: 2018-08-21 | Stop reason: HOSPADM

## 2018-08-21 RX ORDER — BUPIVACAINE HYDROCHLORIDE AND EPINEPHRINE 2.5; 5 MG/ML; UG/ML
INJECTION, SOLUTION EPIDURAL; INFILTRATION; INTRACAUDAL; PERINEURAL AS NEEDED
Status: DISCONTINUED | OUTPATIENT
Start: 2018-08-21 | End: 2018-08-21 | Stop reason: HOSPADM

## 2018-08-21 RX ORDER — PROPOFOL 10 MG/ML
INJECTION, EMULSION INTRAVENOUS AS NEEDED
Status: DISCONTINUED | OUTPATIENT
Start: 2018-08-21 | End: 2018-08-21 | Stop reason: HOSPADM

## 2018-08-21 RX ORDER — DIPHENHYDRAMINE HYDROCHLORIDE 50 MG/ML
12.5 INJECTION, SOLUTION INTRAMUSCULAR; INTRAVENOUS AS NEEDED
Status: DISCONTINUED | OUTPATIENT
Start: 2018-08-21 | End: 2018-08-21 | Stop reason: HOSPADM

## 2018-08-21 RX ORDER — ROCURONIUM BROMIDE 10 MG/ML
INJECTION, SOLUTION INTRAVENOUS AS NEEDED
Status: DISCONTINUED | OUTPATIENT
Start: 2018-08-21 | End: 2018-08-21 | Stop reason: HOSPADM

## 2018-08-21 RX ORDER — SODIUM CHLORIDE 0.9 % (FLUSH) 0.9 %
5-10 SYRINGE (ML) INJECTION EVERY 8 HOURS
Status: DISCONTINUED | OUTPATIENT
Start: 2018-08-21 | End: 2018-08-21 | Stop reason: HOSPADM

## 2018-08-21 RX ORDER — SODIUM CHLORIDE 9 MG/ML
25 INJECTION, SOLUTION INTRAVENOUS CONTINUOUS
Status: DISCONTINUED | OUTPATIENT
Start: 2018-08-21 | End: 2018-08-21 | Stop reason: HOSPADM

## 2018-08-21 RX ORDER — MIDAZOLAM HYDROCHLORIDE 1 MG/ML
INJECTION, SOLUTION INTRAMUSCULAR; INTRAVENOUS AS NEEDED
Status: DISCONTINUED | OUTPATIENT
Start: 2018-08-21 | End: 2018-08-21 | Stop reason: HOSPADM

## 2018-08-21 RX ORDER — SUCCINYLCHOLINE CHLORIDE 20 MG/ML
INJECTION INTRAMUSCULAR; INTRAVENOUS AS NEEDED
Status: DISCONTINUED | OUTPATIENT
Start: 2018-08-21 | End: 2018-08-21 | Stop reason: HOSPADM

## 2018-08-21 RX ORDER — FENTANYL CITRATE 50 UG/ML
50 INJECTION, SOLUTION INTRAMUSCULAR; INTRAVENOUS AS NEEDED
Status: DISCONTINUED | OUTPATIENT
Start: 2018-08-21 | End: 2018-08-21 | Stop reason: HOSPADM

## 2018-08-21 RX ORDER — OXYCODONE HYDROCHLORIDE 5 MG/1
5-10 TABLET ORAL
Qty: 50 TAB | Refills: 0 | Status: SHIPPED | OUTPATIENT
Start: 2018-08-21 | End: 2019-04-11 | Stop reason: ALTCHOICE

## 2018-08-21 RX ORDER — MORPHINE SULFATE 10 MG/ML
2 INJECTION, SOLUTION INTRAMUSCULAR; INTRAVENOUS
Status: DISCONTINUED | OUTPATIENT
Start: 2018-08-21 | End: 2018-08-21 | Stop reason: HOSPADM

## 2018-08-21 RX ORDER — OXYCODONE HYDROCHLORIDE 5 MG/1
5 TABLET ORAL ONCE
Status: COMPLETED | OUTPATIENT
Start: 2018-08-21 | End: 2018-08-21

## 2018-08-21 RX ORDER — ROPIVACAINE HYDROCHLORIDE 5 MG/ML
150 INJECTION, SOLUTION EPIDURAL; INFILTRATION; PERINEURAL AS NEEDED
Status: DISCONTINUED | OUTPATIENT
Start: 2018-08-21 | End: 2018-08-21 | Stop reason: HOSPADM

## 2018-08-21 RX ORDER — SODIUM CHLORIDE, SODIUM LACTATE, POTASSIUM CHLORIDE, CALCIUM CHLORIDE 600; 310; 30; 20 MG/100ML; MG/100ML; MG/100ML; MG/100ML
100 INJECTION, SOLUTION INTRAVENOUS CONTINUOUS
Status: DISCONTINUED | OUTPATIENT
Start: 2018-08-21 | End: 2018-08-21 | Stop reason: HOSPADM

## 2018-08-21 RX ORDER — ONDANSETRON 2 MG/ML
INJECTION INTRAMUSCULAR; INTRAVENOUS AS NEEDED
Status: DISCONTINUED | OUTPATIENT
Start: 2018-08-21 | End: 2018-08-21 | Stop reason: HOSPADM

## 2018-08-21 RX ORDER — DEXAMETHASONE SODIUM PHOSPHATE 4 MG/ML
INJECTION, SOLUTION INTRA-ARTICULAR; INTRALESIONAL; INTRAMUSCULAR; INTRAVENOUS; SOFT TISSUE AS NEEDED
Status: DISCONTINUED | OUTPATIENT
Start: 2018-08-21 | End: 2018-08-21 | Stop reason: HOSPADM

## 2018-08-21 RX ORDER — MIDAZOLAM HYDROCHLORIDE 1 MG/ML
1 INJECTION, SOLUTION INTRAMUSCULAR; INTRAVENOUS AS NEEDED
Status: DISCONTINUED | OUTPATIENT
Start: 2018-08-21 | End: 2018-08-21 | Stop reason: HOSPADM

## 2018-08-21 RX ORDER — NEOSTIGMINE METHYLSULFATE 1 MG/ML
INJECTION INTRAVENOUS AS NEEDED
Status: DISCONTINUED | OUTPATIENT
Start: 2018-08-21 | End: 2018-08-21 | Stop reason: HOSPADM

## 2018-08-21 RX ORDER — MIDAZOLAM HYDROCHLORIDE 1 MG/ML
0.5 INJECTION, SOLUTION INTRAMUSCULAR; INTRAVENOUS
Status: DISCONTINUED | OUTPATIENT
Start: 2018-08-21 | End: 2018-08-21 | Stop reason: HOSPADM

## 2018-08-21 RX ORDER — BUPIVACAINE HYDROCHLORIDE AND EPINEPHRINE 2.5; 5 MG/ML; UG/ML
30 INJECTION, SOLUTION EPIDURAL; INFILTRATION; INTRACAUDAL; PERINEURAL ONCE
Status: DISCONTINUED | OUTPATIENT
Start: 2018-08-21 | End: 2018-08-21 | Stop reason: HOSPADM

## 2018-08-21 RX ORDER — FENTANYL CITRATE 50 UG/ML
INJECTION, SOLUTION INTRAMUSCULAR; INTRAVENOUS AS NEEDED
Status: DISCONTINUED | OUTPATIENT
Start: 2018-08-21 | End: 2018-08-21 | Stop reason: HOSPADM

## 2018-08-21 RX ORDER — LIDOCAINE HYDROCHLORIDE 20 MG/ML
INJECTION, SOLUTION EPIDURAL; INFILTRATION; INTRACAUDAL; PERINEURAL AS NEEDED
Status: DISCONTINUED | OUTPATIENT
Start: 2018-08-21 | End: 2018-08-21 | Stop reason: HOSPADM

## 2018-08-21 RX ORDER — SODIUM CHLORIDE, SODIUM LACTATE, POTASSIUM CHLORIDE, CALCIUM CHLORIDE 600; 310; 30; 20 MG/100ML; MG/100ML; MG/100ML; MG/100ML
INJECTION, SOLUTION INTRAVENOUS
Status: DISCONTINUED | OUTPATIENT
Start: 2018-08-21 | End: 2018-08-21 | Stop reason: HOSPADM

## 2018-08-21 RX ORDER — ONDANSETRON 2 MG/ML
4 INJECTION INTRAMUSCULAR; INTRAVENOUS AS NEEDED
Status: DISCONTINUED | OUTPATIENT
Start: 2018-08-21 | End: 2018-08-21 | Stop reason: HOSPADM

## 2018-08-21 RX ADMIN — PROPOFOL 200 MG: 10 INJECTION, EMULSION INTRAVENOUS at 14:08

## 2018-08-21 RX ADMIN — SUCCINYLCHOLINE CHLORIDE 160 MG: 20 INJECTION INTRAMUSCULAR; INTRAVENOUS at 14:08

## 2018-08-21 RX ADMIN — OXYCODONE HYDROCHLORIDE 5 MG: 5 TABLET ORAL at 16:07

## 2018-08-21 RX ADMIN — ROCURONIUM BROMIDE 5 MG: 10 INJECTION, SOLUTION INTRAVENOUS at 14:08

## 2018-08-21 RX ADMIN — MIDAZOLAM HYDROCHLORIDE 2 MG: 1 INJECTION, SOLUTION INTRAMUSCULAR; INTRAVENOUS at 14:02

## 2018-08-21 RX ADMIN — LIDOCAINE HYDROCHLORIDE 100 MG: 20 INJECTION, SOLUTION EPIDURAL; INFILTRATION; INTRACAUDAL; PERINEURAL at 14:08

## 2018-08-21 RX ADMIN — NEOSTIGMINE METHYLSULFATE 3 MG: 1 INJECTION INTRAVENOUS at 14:58

## 2018-08-21 RX ADMIN — ONDANSETRON 4 MG: 2 INJECTION INTRAMUSCULAR; INTRAVENOUS at 16:01

## 2018-08-21 RX ADMIN — GLYCOPYRROLATE 0.4 MG: 0.2 INJECTION INTRAMUSCULAR; INTRAVENOUS at 14:58

## 2018-08-21 RX ADMIN — ROCURONIUM BROMIDE 20 MG: 10 INJECTION, SOLUTION INTRAVENOUS at 14:20

## 2018-08-21 RX ADMIN — DEXAMETHASONE SODIUM PHOSPHATE 4 MG: 4 INJECTION, SOLUTION INTRA-ARTICULAR; INTRALESIONAL; INTRAMUSCULAR; INTRAVENOUS; SOFT TISSUE at 14:31

## 2018-08-21 RX ADMIN — SODIUM CHLORIDE, SODIUM LACTATE, POTASSIUM CHLORIDE, CALCIUM CHLORIDE: 600; 310; 30; 20 INJECTION, SOLUTION INTRAVENOUS at 14:02

## 2018-08-21 RX ADMIN — ONDANSETRON 4 MG: 2 INJECTION INTRAMUSCULAR; INTRAVENOUS at 14:31

## 2018-08-21 RX ADMIN — FENTANYL CITRATE 100 MCG: 50 INJECTION, SOLUTION INTRAMUSCULAR; INTRAVENOUS at 14:08

## 2018-08-21 NOTE — IP AVS SNAPSHOT
[address] [facility] [address] [place] [address] 
[phone] Patient: Darleen Doll MRN: [id number] A check yaima indicates which time of day the medication should be taken. My Medications START taking these medications Instructions Each Dose to Equal  
 Morning Noon Evening Bedtime  
 oxyCODONE IR 5 mg immediate release tablet Commonly known as:  Jordy Fuelling Your last dose was: Your next dose is: Take 1-2 Tabs by mouth every four (4) hours as needed for Pain. Max Daily Amount: 60 mg.  
 5-10 mg Where to Get Your Medications Information on where to get these meds will be given to you by the nurse or doctor. ! Ask your nurse or doctor about these medications  
  oxyCODONE IR 5 mg immediate release tablet

## 2018-08-21 NOTE — ROUTINE PROCESS
Patient: Blake Kingsley MRN: [id number]  SSN: [id number]   YOB: 1975  Age: 38 y.o. Sex: male     Patient is status post Procedure(s):  RIGID PROCTOSIGMOIDOSCOPY, ANAL SPHINCTEROTOMY, HEMORRHOIDECTOMY, EXCISION OF RIGHT POSTERIOR   HYPERTROPIC ANAL PAPILLA.     Surgeon(s) and Role:     * Ioana Holt MD - Primary    Local/Dose/Irrigation:  25% MARCAINE WITH EPI, 14 ML                                         Dressing/Packing:  Wound Anus-DRESSING TYPE: 4 x 4;ABD pad (XEROFORM , SURGIFOAM PACKING INTO RECTUM) (08/21/18 1300)  Splint/Cast:  ]    Other:

## 2018-08-21 NOTE — H&P
History and Physical (outpatient)    Patient: Galina Matta 38 y.o. male     Chief Complaint: Chronic anal fissure and symptomatic hemorrhoids. History of Present Illness: The patient is a 68-year-old male who has been having pain during and after bowel movements for approximately one year. He was diagnosed with a posterior midline anal fissure in January of this year, and he has been treated medically with nitroglycerin ointment and with Diltiazem ointment. With the treatment, his symptoms have improved but not resolved. History:  Past Medical History:   Diagnosis Date    Chronic anal fissure     Hyperthyroidism     Subclinical.  Followed by primary physician and endocrinologist.    Prostatitis        Past Surgical History:   Procedure Laterality Date    HX ADENOIDECTOMY      HX HEMORRHOIDECTOMY      circa 2008    HX HERNIA REPAIR  2008    HX RHINOPLASTY      deviated septum    HX VASECTOMY  2002    [place] COLONOSCOPY FLX DX W/COLLJ SPEC WHEN PFRMD  2011    repeat age 50       Family History   Problem Relation Age of Onset    Thyroid Disease Mother      hyperthyroidism    Diabetes Mother     Seizures Mother     Hypertension Father     Heart Disease Father     Psychiatric Disorder Brother     No Known Problems Brother     Thyroid Disease Maternal Grandmother     Thyroid Disease Maternal Uncle     Cancer Paternal Grandmother      Unknown     Social History     Social History    Marital status:      Spouse name: N/A    Number of children: N/A    Years of education: N/A     Occupational History    Not on file. Social History Main Topics    Smoking status: Never Smoker    Smokeless tobacco: Never Used    Alcohol use Yes      Comment: 2-4 per week    Drug use: No    Sexual activity: Yes     Partners: Female     Birth control/ protection: Surgical     Other Topics Concern    Not on file     Social History Narrative       Allergies:    Allergies   Allergen Reactions    Hydrocodone Nausea and Vomiting    Levaquin [Levofloxacin] Other (comments)     Panic attacks       Current Medications:  Cannot display prior to admission medications because the patient has not been admitted in this contact. No current facility-administered medications for this encounter. Current Outpatient Prescriptions   Medication Sig    nitroglycerin (RECTIV) 0.4 % (w/w) ointment Insert  into rectum every twelve (12) hours every twelve (12) hours. Physical Exam:  There were no vitals taken for this visit. GENERAL:  No apparent distress. LUNGS:  Clear to auscultation bilaterally. HEART:  Regular rate and rhythm with no murmurs, gallops, or rubs. NEUROLOGIC: Alert and oriented. No gross deficits. Alerts:    Hospital Problems  Date Reviewed: 2/1/2018    None          Laboratory:    No results for input(s): HGB, HCT, WBC, PLT, INR, BUN, CREA, K, CRCLT, HGBEXT, HCTEXT, PLTEXT in the last 72 hours. No lab exists for component: PTT, PT, INREXT    Assessment and Plan:  Surgery is indicated and will include a right lateral internal sphincterotomy and hemorrhoidectomies. Rigid proctosigmoidoscopy is also indicated for full evaluation of the rectum. The risks have been discussed, an the patient has agreed to proceed.

## 2018-08-21 NOTE — IP AVS SNAPSHOT
[facility] [address] [place] [address] 
[phone] Patient: Fariha Black MRN: [id number] JIM:[id number] About your hospitalization You were admitted on:  August 21, 2018 You last received care in the:  [facility] PACU You were discharged on:  August 21, 2018 Why you were hospitalized Your primary diagnosis was:  Not on File Follow-up Information Follow up With Details Comments Contact Info Deuce Michel MD   [place] [address] 
[phone] Anne Miranda MD Go on 9/4/2018 AT 11:40  [facility] [address] [place] [address] 
[phone] Discharge Orders None A check yaima indicates which time of day the medication should be taken. My Medications START taking these medications Instructions Each Dose to Equal  
 Morning Noon Evening Bedtime  
 oxyCODONE IR 5 mg immediate release tablet Commonly known as:  [place] Reagin Your last dose was: Your next dose is: Take 1-2 Tabs by mouth every four (4) hours as needed for Pain. Max Daily Amount: 60 mg.  
 5-10 mg Where to Get Your Medications Information on where to get these meds will be given to you by the nurse or doctor. ! Ask your nurse or doctor about these medications  
  oxyCODONE IR 5 mg immediate release tablet Opioid Education Prescription Opioids: What You Need to Know: 
 
Prescription opioids can be used to help relieve moderate-to-severe pain and are often prescribed following a surgery or injury, or for certain health conditions. These medications can be an important part of treatment but also come with serious risks. Opioids are strong pain medicines. Examples include hydrocodone, oxycodone, fentanyl, and morphine. Heroin is an example of an illegal opioid.   It is important to work with your health care provider to make sure you are getting the safest, most effective care. WHAT ARE THE RISKS AND SIDE EFFECTS OF OPIOID USE? Prescription opioids carry serious risks of addiction and overdose, especially with prolonged use. An opioid overdose, often marked by slow breathing, can cause sudden death. The use of prescription opioids can have a number of side effects as well, even when taken as directed. · Tolerance-meaning you might need to take more of a medication for the same pain relief · Physical dependence-meaning you have symptoms of withdrawal when the medication is stopped. Withdrawal symptoms can include nausea, sweating, chills, diarrhea, stomach cramps, and muscle aches. Withdrawal can last up to several weeks, depending on which drug you took and how long you took it. · Increased sensitivity to pain · Constipation · Nausea, vomiting, and dry mouth · Sleepiness and dizziness · Confusion · Depression · Low levels of testosterone that can result in lower sex drive, energy, and strength · Itching and sweating RISKS ARE GREATER WITH:      
· History of drug misuse, substance use disorder, or overdose · Mental health conditions (such as depression or anxiety) · Sleep apnea · Older age (72 years or older) · Pregnancy Avoid alcohol while taking prescription opioids. Also, unless specifically advised by your health care provider, medications to avoid include: · Benzodiazepines (such as Xanax or Valium) · Muscle relaxants (such as Soma or Flexeril) · Hypnotics (such as Ambien or Lunesta) · Other prescription opioids KNOW YOUR OPTIONS Talk to your health care provider about ways to manage your pain that don't involve prescription opioids. Some of these options may actually work better and have fewer risks and side effects. Options may include: 
· Pain relievers such as acetaminophen, ibuprofen, and naproxen · Some medications that are also used for depression or seizures · Physical therapy and exercise · Counseling to help patients learn how to cope better with triggers of pain and stress. · Application of heat or cold compress · Massage therapy · Relaxation techniques Be Informed Make sure you know the name of your medication, how much and how often to take it, and its potential risks & side effects. IF YOU ARE PRESCRIBED OPIOIDS FOR PAIN: 
· Never take opioids in greater amounts or more often than prescribed. Remember the goal is not to be pain-free but to manage your pain at a tolerable level. · Follow up with your primary care provider to: · Work together to create a plan on how to manage your pain. · Talk about ways to help manage your pain that don't involve prescription opioids. · Talk about any and all concerns and side effects. · Help prevent misuse and abuse. · Never sell or share prescription opioids · Help prevent misuse and abuse. · Store prescription opioids in a secure place and out of reach of others (this may include visitors, children, friends, and family). · Safely dispose of unused/unwanted prescription opioids: Find your community drug take-back program or your pharmacy mail-back program, or flush them down the toilet, following guidance from the Food and Drug Administration (www.fda.gov/Drugs/ResourcesForYou). · Visit www.cdc.gov/drugoverdose to learn about the risks of opioid abuse and overdose. · If you believe you may be struggling with addiction, tell your health care provider and ask for guidance or call [address] [place] [address] at [phone]. Discharge Instructions Post-Operative Instructions 1. Diet:  Consume a high fiber diet as tolerated. Such a diet may include fresh fruits, vegetables, and whole grain cereals and breads.   You should also drink 8-10 glasses of water, juice, or other non-alcoholic beverages per day. 2. Fiber Supplements:  Take 1 dose of Metamucil or other over-the-counter fiber supplement (Citrucel, BeneFiber, etc.) as directed each morning and another dose each evening. 3. Medications: Take pain medication as prescribed. Do not drive, drink alcohol, or operate machinery while taking prescription pain medication. Take docusate (non-prescription stool softener) twice per day as directed. Beginning on the second day after surgery, you may take ibuprofen as directed in addition to or instead of the prescription medication if there has been no significant bleeding. Do not take pain medication on an empty stomach. Do not take aspirin for 10 days following the procedure. 4. Activity:  Do not engage in any heavy lifting or other strenuous activity until you have been seen for follow-up. You may walk as much as you want, and you may climb stairs. Frequent walking will help prevent constipation. 5. Sitz Baths (soaking):  Remove the dressing on the first day after surgery or just before your next bowel movement (whichever comes first), then begin performing sitz baths 3 times per day and after bowel movements. The water should be as hot as you can tolerate, and you should soak for no longer than 20 minutes at a time. Do not wipe the anal area with dry toilet tissue; instead, use baby wipes. After sitz baths, cover the anal area with a gauze dressing. You may also apply Neosporin, Neosporin + Pain Relief, or Nupercainal ointment (a topical anesthetic). 6. Constipation:  If more than one day passes without a bowel movement, take 1 Tablespoon of Milk of Magnesia. Repeat in 6 hours if you have no results. If taking the second dose of the Milk of Magnesia does not result in a bowel movement, take two to four 5 mg Dulcolax (bisacodyl) tablets.   If you have not had a bowel movement by the next morning after taking the bisacodyl, call my office. 7. Bleeding:  A small amount of bright red bleeding can be expected for the next several days. If bleeding appears to be continuous, call my office. 8. Other Problems:  If you experience intolerable pain, fever (temperature of 101 or more), or inability to urinate, call my office. 9. Questions: If you have any questions about your post-operative condition or care, do not hesitate to call my office. 10. Work:  You may return to work in two weeks. 11.  Other: For anything other than scheduling, please call [phone]. 12.  Follow-up:  Please return to my office at 11:40 AM on Tuesday 9/4/2018. If you need to change the appointment, please call [phone] on a weekday between 9:00 AM and noon. Neil Calix M.D. 
[phone] Introducing hospitals & HEALTH SERVICES! Dear Nida Decree: Thank you for requesting a [employer] account. Our records indicate that you already have an active [employer] account. You can access your account anytime at [email or web address] Did you know that you can access your hospital and ER discharge instructions at any time in [employer]? You can also review all of your test results from your hospital stay or ER visit. Additional Information If you have questions, please visit the Frequently Asked Questions section of the [employer] website at [email or web address] Remember, [employer] is NOT to be used for urgent needs. For medical emergencies, dial 911. Now available from your iPhone and Android! Introducing Ruddy Juárez As a [employer] patient, I wanted to make you aware of our electronic visit tool called Ruddy Juárez. [employer] 24/7 allows you to connect within minutes with a medical provider 24 hours a day, seven days a week via a mobile device or tablet or logging into a secure website from your computer.   You can access [facility] [employer] 24/7 from anywhere in the [place]. A virtual visit might be right for you when you have a simple condition and feel like you just dont want to get out of bed, or cant get away from work for an appointment, when your [facility] is not available (evenings, weekends or holidays), or when youre out of town and need minor care. Electronic visits cost only $49 and if the [facility] 24/7 provider determines a prescription is needed to treat your condition, one can be electronically transmitted to a nearby pharmacy*. Please take a moment to enroll today if you have not already done so. The enrollment process is free and takes just a few minutes. To enroll, please download the [email or web address] 24/7 amanda to your tablet or phone, or visit [email or web address] to enroll on your computer. And, as an [address] [place] [address] patient with a [employer] account, the results of your visits will be scanned into your electronic medical record and your primary care provider will be able to view the scanned results. We urge you to continue to see your regular [facility] provider for your ongoing medical care. And while your primary care provider may not be the one available when you seek a Ruddy Robbelgica virtual visit, the peace of mind you get from getting a real diagnosis real time can be priceless. For more information on Ruddy Juárez, view our Frequently Asked Questions (FAQs) at [email or web address]. Sincerely, 
 
Aurelio Perez MD 
Chief Medical Officer Trevor Lorie Blum *:  certain medications cannot be prescribed via Ruddy Juárez Providers Seen During Your Hospitalization Provider Specialty Primary office phone Kuldip Mcmullen MD Colon and Rectal Surgery [phone] Your Primary Care Physician (PCP) Primary Care Physician Office Phone Office Fax Rei Reynolds [phone] You are allergic to the following Allergen Reactions Hydrocodone Nausea and Vomiting Levaquin (Levofloxacin) Other (comments) Panic attacks Recent Documentation Height Weight BMI Smoking Status 1.803 m 74.8 kg 23.01 kg/m2 Never Smoker Emergency Contacts Name Discharge Info Relation Home Work Mobile Isabel Mcdonough CAREGIVER [3] Spouse [3] [phone] Patient Belongings The following personal items are in your possession at time of discharge: 
  Dental Appliances: None         Home Medications: None   Jewelry: None  Clothing:  (clothing to PACU)    Other Valuables: (S) Other (comment) (book) Discharge Instructions Attachments/References MEFS - OXYCODONE, RAPID RELEASE (ETH-OXYDOSE, OXY IR, ROXICODONE) - (BY MOUTH) (ENGLISH) Patient Handouts Oxycodone, Rapid Release (ETH-Oxydose, Oxy IR, Roxicodone) - (By mouth) Why this medicine is used:  
Treats moderate to severe pain. This medicine is a narcotic pain reliever. Contact a nurse or doctor right away if you have: 
· Fast or slow heart beat, shallow breathing, blue lips, skin or fingernails · Anxiety, restlessness, fever, sweating, muscle spasms, twitching, seeing or hearing things that are not there · Extreme weakness, shallow breathing, slow heartbeat · Severe confusion, lightheadedness, dizziness, fainting · Sweating or cold, clammy skin, seizures · Severe constipation, stomach pain, nausea, vomiting Common side effects: · Mild constipation · Sleepiness, tiredness © 2017 [address]  Information is for End User's use only and may not be sold, redistributed or otherwise used for commercial purposes. Please provide this summary of care documentation to your next provider. Signatures-by signing, you are acknowledging that this After Visit Summary has been reviewed with you and you have received a copy. Patient Signature:  ____________________________________________________________ Date:  ____________________________________________________________  
  
Isela Juan Provider Signature:  ____________________________________________________________ Date:  ____________________________________________________________

## 2018-08-21 NOTE — DISCHARGE INSTRUCTIONS
Post-Operative Instructions      1. Diet:  Consume a high fiber diet as tolerated. Such a diet may include fresh fruits, vegetables, and whole grain cereals and breads. You should also drink 8-10 glasses of water, juice, or other non-alcoholic beverages per day. 2. Fiber Supplements:  Take 1 dose of Metamucil or other over-the-counter fiber supplement (Citrucel, BeneFiber, etc.) as directed each morning and another dose each evening. 3. Medications: Take pain medication as prescribed. Do not drive, drink alcohol, or operate machinery while taking prescription pain medication. Take docusate (non-prescription stool softener) twice per day as directed. Beginning on the second day after surgery, you may take ibuprofen as directed in addition to or instead of the prescription medication if there has been no significant bleeding. Do not take pain medication on an empty stomach. Do not take aspirin for 10 days following the procedure. 4. Activity:  Do not engage in any heavy lifting or other strenuous activity until you have been seen for follow-up. You may walk as much as you want, and you may climb stairs. Frequent walking will help prevent constipation. 5. Sitz Baths (soaking):  Remove the dressing on the first day after surgery or just before your next bowel movement (whichever comes first), then begin performing sitz baths 3 times per day and after bowel movements. The water should be as hot as you can tolerate, and you should soak for no longer than 20 minutes at a time. Do not wipe the anal area with dry toilet tissue; instead, use baby wipes. After sitz baths, cover the anal area with a gauze dressing. You may also apply Neosporin, Neosporin + Pain Relief, or Nupercainal ointment (a topical anesthetic). 6. Constipation:  If more than one day passes without a bowel movement, take 1 Tablespoon of Milk of Magnesia. Repeat in 6 hours if you have no results.   If taking the second dose of the Milk of Magnesia does not result in a bowel movement, take two to four 5 mg Dulcolax (bisacodyl) tablets. If you have not had a bowel movement by the next morning after taking the bisacodyl, call my office. 7. Bleeding:  A small amount of bright red bleeding can be expected for the next several days. If bleeding appears to be continuous, call my office. 8. Other Problems:  If you experience intolerable pain, fever (temperature of 101 or more), or inability to urinate, call my office. 9. Questions: If you have any questions about your post-operative condition or care, do not hesitate to call my office. 10. Work:  You may return to work in two weeks. 11.  Other: For anything other than scheduling, please call [phone]. 12.  Follow-up:  Please return to my office at 11:40 AM on Tuesday 9/4/2018. If you need to change the appointment, please call [phone] on a weekday between 9:00 AM and noon.        Meredith Segundo M.D.  [phone]

## 2018-08-21 NOTE — BRIEF OP NOTE
BRIEF OPERATIVE NOTE    Date of Procedure:  8/21/2018   Preoperative Diagnosis:  Chronic anal fissure. Hemorrhoids. Postoperative Diagnosis:  Chronic anal fissure. Hemorrhoids. Hypertrophic anal papilla. Procedure:  Rigid proctosigmoidoscopy, internal and external hemorrhoidectomies, left lateral internal sphincterotomy, and excision of hypertrophic anal papilla. Surgeon:  Eliot Watts MD  Assistant:  Jazmine Eid SA  Anesthesia:  Generalendotracheal  Estimated Blood Loss:  < 25 mL  Crystalloid:  750 mL  Specimens:   ID Type Source Tests Collected by Time Destination   1 : HEMORROIDS Fresh Rectal  Eliot Watts MD 8/21/2018 1436 Pathology   2 : RIGHT POSTERIOR HYPERTROPIC ANAL PAPILLA Fresh Rectum  Eliot Watts MD 8/21/2018 1448 Pathology     Tubes and Drains:  None. Findings:  Posterior midline anal fissure. Internal and external hemorrhoidal enlargement on the left lateral and right lateral positions. Right posterior quadrant hypertrophic anal papilla. Otherwise normal rigid proctosigmoidoscopy to approximately 14 cm from the anal verge, but visualization somewhat compromised by retained stool. Complications: None apparent. Implants:  None.

## 2018-08-23 NOTE — OP NOTES
[address]   OPERATIVE REPORT    Justo Myrick  MR#: [id number]  : 1975  ACCOUNT #: [de-identified]     DATE OF SERVICE: 2018    PREOPERATIVE DIAGNOSES:  1. Chronic anal fissure. 2.  Hemorrhoids. POSTOPERATIVE DIAGNOSES:  1. Chronic anal fissure. 2.  Hemorrhoids. 3.  Hypertrophic anal papilla. PROCEDURES PERFORMED:  Rigid proctosigmoidoscopy, internal and external hemorrhoidectomies, left lateral internal sphincterotomy, and excision of hypertrophic anal papilla. SURGEON:  Vilma Ibanez MD    ASSISTANT:  Candace Domínguez SA    ANESTHESIA:  General endotracheal.    SPECIMENS REMOVED:  1. Hemorrhoids. 2.  Right posterior hypertrophic anal papilla. TUBES AND DRAINS:  None. ESTIMATED BLOOD LOSS:  Less than 25 mL. CRYSTALLOID:  750 mL. COMPLICATIONS:  None apparent. IMPLANTS:  None. INDICATION FOR PROCEDURE:  The patient is a 72-year-old male who has been having pain during and after bowel movements for approximately 1 year. He was diagnosed with a posterior midline anal fissure in January of this year, and he has been treated medically with nitroglycerin ointment and with diltiazem ointment. With the treatment, his symptoms have improved, but not resolved. Surgery is indicated to facilitate healing of the fissure as well as to remove symptomatic hemorrhoids. Rigid proctosigmoidoscopy is also indicated for full evaluation of the rectum. The risks of surgery have been discussed, and the patient has agreed to proceed. OPERATIVE FINDINGS:  There was a posterior midline anal fissure. There was internal and external hemorrhoidal enlargement in the left lateral and right lateral positions, with that on the left side being much more significant. In the right posterior quadrant there was a hypertrophic anal papilla.   Rigid proctosigmoidoscopy to approximately 14 cm from the anal verge was otherwise normal, but visualization was somewhat compromised by retained stool. DESCRIPTION OF PROCEDURE:  After informed consent was obtained, the patient was taken to the operating room where standard monitoring devices were attached and adequate general endotracheal anesthesia was induced. He was then placed in the prone jackknife position on the operating table with all pressure points padded appropriately and with the lower extremities fitted with pneumatic compression stockings. The buttocks were retracted bilaterally using Mastisol and tape. Visual inspection and digital rectal examination were performed followed by insertion of the lubricated rigid proctosigmoidoscope. The scope was advanced under direct vision to approximately 14 cm from the anal verge. As noted above, the visualization was somewhat limited due to retained stool. Careful inspection was performed during withdrawal of the scope. Next, the perineum was prepped and draped in the usual sterile fashion. Exposure within the anal canal was facilitated using the large Hill-Canales retractor. Because the hemorrhoids in the left lateral position were large and symptomatic and the plan had included their excision, it was decided that the internal sphincterotomy would also be performed on the left side. A ligating figure-of-eight 3-0 Vicryl suture was placed proximally to control the main vascular pedicle of the internal hemorrhoidal components. The hemorrhoidal tissue was then sharply excised using scalpel and scissors. The underlying sphincter muscle was uncovered. The intersphincteric groove was palpated and then a tonsil clamp was inserted into the groove and used to elevate the fibers of the distal portion of the internal sphincter. A short sphincterotomy was then performed using the electrocautery. The wound was painted with Betadine and then irrigated with saline.   Wound closure was performed using running and interrupted 3-0 Vicryl sutures, and in this way hemostasis was also achieved. Moving to the right side, another group of internal and external hemorrhoids was addressed. These were also sharply excised, and then wound closure was performed with the running and interrupted 3-0 Vicryl sutures. The hypertrophic anal papilla in the right posterior quadrant was excised using the electrocautery and sutures were placed in the wound. Final inspection revealed good hemostasis. 0.25% bupivacaine with epinephrine was infiltrated to provide some postoperative analgesia. Three small strips of Gelfoam were inserted into the anal canal, and an external dressing consisting of Xeroform, 4 x 4's, and an ABD was put in place. There were no apparent complications, and the patient appeared to have tolerated the procedure well. At its conclusion, he was extubated and transported in stable condition to the recovery room.             Brigitte Cheney MD       PG / DN  D: 08/22/2018 22:28     T: 08/23/2018 09:45  JOB #: [id number]  CC: Bethanie King MD  CC: Galindo Quiles MD

## 2019-04-11 ENCOUNTER — OFFICE VISIT (OUTPATIENT)
Dept: INTERNAL MEDICINE CLINIC | Age: 44
End: 2019-04-11

## 2019-04-11 VITALS
HEIGHT: 71 IN | HEART RATE: 96 BPM | BODY MASS INDEX: 22.72 KG/M2 | WEIGHT: 162.3 LBS | DIASTOLIC BLOOD PRESSURE: 84 MMHG | SYSTOLIC BLOOD PRESSURE: 126 MMHG | RESPIRATION RATE: 18 BRPM | TEMPERATURE: 98.5 F | OXYGEN SATURATION: 95 %

## 2019-04-11 DIAGNOSIS — J06.9 UPPER RESPIRATORY TRACT INFECTION, UNSPECIFIED TYPE: Primary | ICD-10-CM

## 2019-04-11 RX ORDER — AZITHROMYCIN 250 MG/1
TABLET, FILM COATED ORAL
Qty: 6 TAB | Refills: 0 | Status: SHIPPED | OUTPATIENT
Start: 2019-04-11 | End: 2019-04-16

## 2019-04-11 NOTE — PATIENT INSTRUCTIONS
Adults: For nasal congestion, cough and cold/flu symptoms I advised: - Seek medical care if symptoms become more severe or if you develop  
   chest pain, shortness of breath, confusion. 
  - Contact us if your symptoms fail to improve after 7-10 days - Rest as much as possible and stay home from work/school at least 24 hours  
               after last fever - Wash hand frequently and cough/sneeze into your sleeve to help prevent  
    infection of others - Drink plenty of fluids - Ibuprofen (Advil, Motrin) 400-800mg every 6 hours or Aleve 220 mg 1-2 pills every 8 hours for fever, headache, pain - Tylenol extra strength 500 mg every 6 hours for pain, headache, fever 
 - Nasal saline rinses 2-3 times daily for nasal congestion - Mucinex 1200 mg twice daily or Guaifenesin 400 mg every 4 hours for chest  
    congestion - Robitussin DM or Delsym for cough(suppress cough and thin mucus. ) 
 - Cepacol throat lozenges and saline gargles (1 tsp salt in 8 oz water) for sore  
    throat - Tea with honey for cough (buckwheat honey preferred) - Benadryl (diphenhydramine) 50 mg at night for nasal congestion/allergies - Pseudoephedrine 12-hour tablets twice daily for nasal and inner ear    
  -Ask your pharmacist (this is kept behind the counter) -If you have high blood pressure or heart disease, use this    
   medication with caution (ask your doctor), alternative coricidin - Afrin (oxymetazoline) nasal spray 2 sprays in each nostril twice daily for severe  
   congestion.   
   -Do not use this medication for more than 3 days as it may cause \"rebound congestion\". -If you have high blood pressure or heart disease, use this medication  
    with caution (ask your doctor)

## 2019-04-11 NOTE — PROGRESS NOTES
Chief Complaint Patient presents with  Sinus Infection  
 
he is a 41y.o. year old male who presents for evaluation of cough and SOB 
congestion, dry cough and fever for 4 days. YESlow grade fevers. no nausea and no vomiting . No sore throat, swollen glands, myalgias, itching in eyes and chills. Over-the-counter remedies including tylenol   has been used with poor relief of symptoms. Hx Asthma:  no 
Smoker:  no 
Contacts with similar infections: no  
Recent travel:no Sputum Description: green Reviewed and agree with Nurse Note and duplicated in this note. Reviewed PmHx, RxHx, FmHx, SocHx, AllgHx and updated and dated in the chart. Family History Problem Relation Age of Onset  Thyroid Disease Mother   
     hyperthyroidism  Diabetes Mother  Seizures Mother  Hypertension Father  Heart Disease Father  Psychiatric Disorder Brother  No Known Problems Brother  Thyroid Disease Maternal Grandmother  Thyroid Disease Maternal Uncle  Cancer Paternal Grandmother Unknown Past Medical History:  
Diagnosis Date  Chronic anal fissure  Hyperthyroidism Subclinical.  Followed by primary physician and endocrinologist.  
[address] [place] [address] Prostatitis Social History Socioeconomic History  Marital status:  Spouse name: Not on file  Number of children: Not on file  Years of education: Not on file  Highest education level: Not on file Tobacco Use  Smoking status: Never Smoker  Smokeless tobacco: Never Used Substance and Sexual Activity  Alcohol use: Yes Comment: 2-4 per week  Drug use: No  
 Sexual activity: Yes  
  Partners: Female Birth control/protection: Surgical  
  
 
Review of Systems - negative except as listed above Objective:  
 
Vitals:  
 04/11/19 1039 BP: 126/84 Pulse: 96  
Resp: 18 Temp: 98.5 °F (36.9 °C) TempSrc: Oral  
SpO2: 95% Weight: 162 lb 4.8 oz (73.6 kg) Height: 5' 11\" (1.803 m) Physical Examination: General appearance - alert, well appearing, and in no distress Eyes - pupils equal and reactive, extraocular eye movements intact Ears - bilateral TM's and external ear canals normal 
Nose - normal and patent, no erythema, discharge or polyps Mouth - mucous membranes moist, pharynx normal without lesions Neck - supple, no significant adenopathy Chest - clear to auscultation, no wheezes, rales or rhonchi, symmetric air entry Heart - normal rate, regular rhythm, normal S1, S2, no murmurs, rubs, clicks or gallops Abdomen - soft, nontender, nondistended, no masses or organomegaly Musculoskeletal - no joint tenderness, deformity or swelling Extremities - peripheral pulses normal, no pedal edema, no clubbing or cyanosis Skin - normal coloration and turgor, no rashes, no suspicious skin lesions noted Assessment/ Plan:  
Diagnoses and all orders for this visit: 
 
1. Upper respiratory tract infection, unspecified type -     XR CHEST PA LAT; Future Other orders 
-     azithromycin (ZITHROMAX) 250 mg tablet; Take 2 tablets today, then take 1 tablet daily Adults: For nasal congestion, cough and cold/flu symptoms I advised: - Seek medical care if symptoms become more severe or if you develop  
   chest pain, shortness of breath, confusion. 
  - Contact us if your symptoms fail to improve after 7-10 days - Rest as much as possible and stay home from work/school at least 24 hours  
               after last fever - Wash hand frequently and cough/sneeze into your sleeve to help prevent  
    infection of others - Drink plenty of fluids - Ibuprofen (Advil, Motrin) 400-800mg every 6 hours or Aleve 220 mg 1-2 pills every 8 hours for fever, headache, pain - Tylenol extra strength 500 mg every 6 hours for pain, headache, fever 
 - Nasal saline rinses 2-3 times daily for nasal congestion - Mucinex 1200 mg twice daily or Guaifenesin 400 mg every 4 hours for chest  
    congestion - Robitussin DM or Delsym for cough(suppress cough and thin mucus. ) 
 - Cepacol throat lozenges and saline gargles (1 tsp salt in 8 oz water) for sore  
    throat - Tea with honey for cough (buckwheat honey preferred) - Benadryl (diphenhydramine) 50 mg at night for nasal congestion/allergies - Pseudoephedrine 12-hour tablets twice daily for nasal and inner ear    
  -Ask your pharmacist (this is kept behind the counter) -If you have high blood pressure or heart disease, use this    
   medication with caution (ask your doctor), alternative coricidin - Afrin (oxymetazoline) nasal spray 2 sprays in each nostril twice daily for severe  
   congestion.   
   -Do not use this medication for more than 3 days as it may cause \"rebound congestion\". -If you have high blood pressure or heart disease, use this medication  
    with caution (ask your doctor) Children:  
Sit in bathroom with hot shower running for steam.  
 Nasal saline rinses and Neti pot In general for viral respiratory infection -  Aim for drainage/increased airflow Increase fluids - Pedialyte popsicles, Gatorade mixed with 50% water 1) Remember to stay active and/or exercise regularly (I suggest 30-45 minutes daily) 2) For reliable dietary information, go to www. EATRIGHT.org. You may wish to consider seeing the nutritionist at [facility] [phone], also consider the [address] I have discussed the diagnosis with the patient and the intended plan as seen in the above orders. The patient has received an after-visit summary and questions were answered concerning future plans. Medication Side Effects and Warnings were discussed with patient: yes Patient Labs were reviewed and or requested: yes Patient Past Records were reviewed and or requested  yes I have discussed the diagnosis with the patient and the intended plan as seen in the above orders. The patient has received an after-visit summary and questions were answered concerning future plans. Pt agrees to call or return to clinic and/or go to closest ER with any worsening of symptoms. This may include, but not limited to increased fever (>100.4) with NSAIDS or Tylenol, increased edema, confusion, rash, worsening of presenting symptoms.

## 2020-09-23 ENCOUNTER — VIRTUAL VISIT (OUTPATIENT)
Dept: INTERNAL MEDICINE CLINIC | Age: 45
End: 2020-09-23
Payer: COMMERCIAL

## 2020-09-23 DIAGNOSIS — F41.0 GENERALIZED ANXIETY DISORDER WITH PANIC ATTACKS: Primary | ICD-10-CM

## 2020-09-23 DIAGNOSIS — F41.1 GENERALIZED ANXIETY DISORDER WITH PANIC ATTACKS: Primary | ICD-10-CM

## 2020-09-23 PROCEDURE — 99214 OFFICE O/P EST MOD 30 MIN: CPT | Performed by: FAMILY MEDICINE

## 2020-09-23 RX ORDER — ESCITALOPRAM OXALATE 10 MG/1
10 TABLET ORAL DAILY
Qty: 30 TAB | Refills: 0 | Status: SHIPPED | OUTPATIENT
Start: 2020-09-23 | End: 2020-10-07 | Stop reason: SDUPTHER

## 2020-09-23 NOTE — PROGRESS NOTES
Preet Perea is a 39 y.o. male who was seen by synchronous (real-time) audio-video technology on 9/23/2020 for Anxiety        Assessment & Plan:   Diagnoses and all orders for this visit:    1. Generalized anxiety disorder with panic attacks    Other orders  -     escitalopram oxalate (LEXAPRO) 10 mg tablet; Take 1 Tab by mouth daily. Patient will follow-up in 2 weeks for anxiety, will likely need to increase dose at that time      Subjective:   he is a 39y.o. year old male who presents for follow-up of   Anxiety and/or Depression  Well controlled on SSRI and no other therapies. Ongoing symptoms include: none   Patient denies: suicidal ideation, homocidal ideation. Reported side effects from the treatment: emotional, sleep. Prior to Admission medications    Medication Sig Start Date End Date Taking? Authorizing Provider   escitalopram oxalate (LEXAPRO) 5 mg tablet Take 1 Tab by mouth daily.  9/9/20   Christofer Munoz MD             Objective:     Patient-Reported Vitals 9/9/2020   Patient-Reported Weight 170   Patient-Reported Height 5'11\"   Patient-Reported Pulse 80bpm        [INSTRUCTIONS:  \"[x]\" Indicates a positive item  \"[]\" Indicates a negative item  -- DELETE ALL ITEMS NOT EXAMINED]    Constitutional: [x] Appears well-developed and well-nourished [x] No apparent distress      [] Abnormal -     Mental status: [x] Alert and awake  [x] Oriented to person/place/time [x] Able to follow commands    [] Abnormal -     Eyes:   EOM    [x]  Normal    [] Abnormal -   Sclera  [x]  Normal    [] Abnormal -          Discharge [x]  None visible   [] Abnormal -     HENT: [x] Normocephalic, atraumatic  [] Abnormal -   [x] Mouth/Throat: Mucous membranes are moist    External Ears [x] Normal  [] Abnormal -    Neck: [x] No visualized mass [] Abnormal -     Pulmonary/Chest: [x] Respiratory effort normal   [x] No visualized signs of difficulty breathing or respiratory distress        [] Abnormal - Musculoskeletal:   [x] Normal gait with no signs of ataxia         [x] Normal range of motion of neck        [] Abnormal -     Neurological:        [x] No Facial Asymmetry (Cranial nerve 7 motor function) (limited exam due to video visit)          [x] No gaze palsy        [] Abnormal -          Skin:        [x] No significant exanthematous lesions or discoloration noted on facial skin         [] Abnormal -            Psychiatric:       [x] Normal Affect [] Abnormal -        [x] No Hallucinations    Other pertinent observable physical exam findings:-        We discussed the expected course, resolution and complications of the diagnosis(es) in detail. Medication risks, benefits, costs, interactions, and alternatives were discussed as indicated. I advised him to contact the office if his condition worsens, changes or fails to improve as anticipated. He expressed understanding with the diagnosis(es) and plan. Amira Gotti, who was evaluated through a patient-initiated, synchronous (real-time) audio-video encounter, and/or his healthcare decision maker, is aware that it is a billable service, with coverage as determined by his insurance carrier. He provided verbal consent to proceed: Yes, and patient identification was verified. It was conducted pursuant to the emergency declaration under the [facility], [address] [place] [address] authority and the [employer] Act. A caregiver was present when appropriate. Ability to conduct physical exam was limited. I was at home. The patient was at home.       Dulce Kumar MD

## 2020-10-07 ENCOUNTER — VIRTUAL VISIT (OUTPATIENT)
Dept: INTERNAL MEDICINE CLINIC | Age: 45
End: 2020-10-07
Payer: COMMERCIAL

## 2020-10-07 DIAGNOSIS — F41.1 GENERALIZED ANXIETY DISORDER WITH PANIC ATTACKS: Primary | ICD-10-CM

## 2020-10-07 DIAGNOSIS — F41.0 GENERALIZED ANXIETY DISORDER WITH PANIC ATTACKS: Primary | ICD-10-CM

## 2020-10-07 PROCEDURE — 99214 OFFICE O/P EST MOD 30 MIN: CPT | Performed by: FAMILY MEDICINE

## 2020-10-07 RX ORDER — ESCITALOPRAM OXALATE 10 MG/1
10 TABLET ORAL DAILY
Qty: 30 TAB | Refills: 0 | Status: SHIPPED | OUTPATIENT
Start: 2020-10-07 | End: 2020-11-13

## 2020-10-07 NOTE — PROGRESS NOTES
Erica Barlow is a 39 y.o. male who was seen by synchronous (real-time) audio-video technology on 10/7/2020 for No chief complaint on file. I get what looks like you are due for your well man exam so upper scope and a follow-up in for 1 month okay      Assessment & Plan:   Diagnoses and all orders for this visit:    1. Generalized anxiety disorder with panic attacks    Other orders  -     escitalopram oxalate (LEXAPRO) 10 mg tablet; Take 1 Tab by mouth daily. Follow-up in 1 month for complete physical, follow-up in 3 months for anxiety     Subjective:     Patient is following up on generalized anxiety disorder, he has been compliant with taking Lexapro 10 mg daily with no side effects noted. He denies any suicidal homicidal ideations. Patient states that this is been controlling his symptoms very well he is happy with the current dose. Prior to Admission medications    Medication Sig Start Date End Date Taking? Authorizing Provider   escitalopram oxalate (LEXAPRO) 10 mg tablet Take 1 Tab by mouth daily. 10/7/20  Yes Emory Estrella MD     Patient Active Problem List    Diagnosis Date Noted    Subclinical hyperthyroidism 09/06/2017    TSH deficiency 08/03/2012    Fever of unknown origin (FUO) 03/05/2012    Fatigue 02/20/2012    Acid reflux 02/20/2012     Current Outpatient Medications   Medication Sig Dispense Refill    escitalopram oxalate (LEXAPRO) 10 mg tablet Take 1 Tab by mouth daily.  30 Tab 0     Allergies   Allergen Reactions    Hydrocodone Nausea and Vomiting    Levaquin [Levofloxacin] Other (comments)     Panic attacks     Past Medical History:   Diagnosis Date    Chronic anal fissure     Hyperthyroidism     Subclinical.  Followed by primary physician and endocrinologist.   Dmitry Urbano Prostatitis      Past Surgical History:   Procedure Laterality Date    HX ADENOIDECTOMY      HX HEMORRHOIDECTOMY      circa 2008    HX HERNIA REPAIR  2008    HX RHINOPLASTY      deviated septum    HX VASECTOMY  2002    IA COLONOSCOPY FLX DX W/COLLJ SPEC WHEN PFRMD  2011    repeat age 50     Family History   Problem Relation Age of Onset    Thyroid Disease Mother         hyperthyroidism    Diabetes Mother     Seizures Mother     Hypertension Father     Heart Disease Father     Psychiatric Disorder Brother     No Known Problems Brother     Thyroid Disease Maternal Grandmother     Thyroid Disease Maternal Uncle     Cancer Paternal Grandmother         Unknown          Pertinent positive and negative systems noted in HPI, remainder of systems negative      Objective:     Patient-Reported Vitals 9/9/2020   Patient-Reported Weight 170   Patient-Reported Height 5'11\"   Patient-Reported Pulse 80bpm        [INSTRUCTIONS:  \"[x]\" Indicates a positive item  \"[]\" Indicates a negative item  -- DELETE ALL ITEMS NOT EXAMINED]    Constitutional: [x] Appears well-developed and well-nourished [x] No apparent distress      [] Abnormal -     Mental status: [x] Alert and awake  [x] Oriented to person/place/time [x] Able to follow commands    [] Abnormal -     Eyes:   EOM    [x]  Normal    [] Abnormal -   Sclera  [x]  Normal    [] Abnormal -          Discharge [x]  None visible   [] Abnormal -     HENT: [x] Normocephalic, atraumatic  [] Abnormal -   [x] Mouth/Throat: Mucous membranes are moist    External Ears [x] Normal  [] Abnormal -    Neck: [x] No visualized mass [] Abnormal -     Pulmonary/Chest: [x] Respiratory effort normal   [x] No visualized signs of difficulty breathing or respiratory distress        [] Abnormal -      Musculoskeletal:   [x] Normal gait with no signs of ataxia         [x] Normal range of motion of neck        [] Abnormal -     Neurological:        [x] No Facial Asymmetry (Cranial nerve 7 motor function) (limited exam due to video visit)          [x] No gaze palsy        [] Abnormal -          Skin:        [x] No significant exanthematous lesions or discoloration noted on facial skin         [] Abnormal -            Psychiatric:       [x] Normal Affect [] Abnormal -        [x] No Hallucinations    Other pertinent observable physical exam findings:-        We discussed the expected course, resolution and complications of the diagnosis(es) in detail. Medication risks, benefits, costs, interactions, and alternatives were discussed as indicated. I advised him to contact the office if his condition worsens, changes or fails to improve as anticipated. He expressed understanding with the diagnosis(es) and plan. Cesario Aldridge, who was evaluated through a patient-initiated, synchronous (real-time) audio-video encounter, and/or his healthcare decision maker, is aware that it is a billable service, with coverage as determined by his insurance carrier. He provided verbal consent to proceed: Yes, and patient identification was verified. It was conducted pursuant to the emergency declaration under the [address] [place] [address], [address] [place] [address] authority and the [employer] and [employer] General Act. A caregiver was present when appropriate. Ability to conduct physical exam was limited. I was at home. The patient was at home.       Saida Vargas MD

## 2020-11-13 RX ORDER — ESCITALOPRAM OXALATE 10 MG/1
TABLET ORAL
Qty: 30 TAB | Refills: 0 | Status: SHIPPED | OUTPATIENT
Start: 2020-11-13 | End: 2020-12-17

## 2020-12-17 RX ORDER — ESCITALOPRAM OXALATE 10 MG/1
TABLET ORAL
Qty: 30 TAB | Refills: 0 | Status: SHIPPED | OUTPATIENT
Start: 2020-12-17 | End: 2021-01-13

## 2021-01-13 RX ORDER — ESCITALOPRAM OXALATE 10 MG/1
TABLET ORAL
Qty: 30 TAB | Refills: 0 | Status: SHIPPED | OUTPATIENT
Start: 2021-01-13 | End: 2021-02-15

## 2021-02-15 RX ORDER — ESCITALOPRAM OXALATE 10 MG/1
TABLET ORAL
Qty: 30 TAB | Refills: 0 | Status: SHIPPED | OUTPATIENT
Start: 2021-02-15 | End: 2021-03-01

## 2021-03-01 RX ORDER — ESCITALOPRAM OXALATE 10 MG/1
TABLET ORAL
Qty: 30 TAB | Refills: 0 | Status: SHIPPED | OUTPATIENT
Start: 2021-03-01 | End: 2021-04-21

## 2021-04-21 RX ORDER — ESCITALOPRAM OXALATE 10 MG/1
TABLET ORAL
Qty: 30 TAB | Refills: 0 | Status: SHIPPED | OUTPATIENT
Start: 2021-04-21 | End: 2021-05-31 | Stop reason: SDUPTHER

## 2021-05-31 RX ORDER — ESCITALOPRAM OXALATE 10 MG/1
TABLET ORAL
Qty: 30 TABLET | Refills: 0 | Status: SHIPPED | OUTPATIENT
Start: 2021-05-31 | End: 2021-06-23

## 2021-06-01 RX ORDER — ESCITALOPRAM OXALATE 10 MG/1
TABLET ORAL
Qty: 30 TABLET | Refills: 0 | Status: SHIPPED | OUTPATIENT
Start: 2021-06-01 | End: 2021-12-28 | Stop reason: ALTCHOICE

## 2021-12-06 ENCOUNTER — OFFICE VISIT (OUTPATIENT)
Dept: INTERNAL MEDICINE CLINIC | Age: 46
End: 2021-12-06
Payer: COMMERCIAL

## 2021-12-06 VITALS
DIASTOLIC BLOOD PRESSURE: 77 MMHG | RESPIRATION RATE: 16 BRPM | BODY MASS INDEX: 25.2 KG/M2 | WEIGHT: 180 LBS | HEIGHT: 71 IN | OXYGEN SATURATION: 94 % | SYSTOLIC BLOOD PRESSURE: 119 MMHG | HEART RATE: 82 BPM | TEMPERATURE: 98.3 F

## 2021-12-06 DIAGNOSIS — F41.1 GENERALIZED ANXIETY DISORDER WITH PANIC ATTACKS: ICD-10-CM

## 2021-12-06 DIAGNOSIS — R07.9 CHEST PAIN, UNSPECIFIED TYPE: ICD-10-CM

## 2021-12-06 DIAGNOSIS — Z00.00 VISIT FOR WELL MAN HEALTH CHECK: Primary | ICD-10-CM

## 2021-12-06 DIAGNOSIS — K21.9 GASTROESOPHAGEAL REFLUX DISEASE, UNSPECIFIED WHETHER ESOPHAGITIS PRESENT: ICD-10-CM

## 2021-12-06 DIAGNOSIS — F41.0 GENERALIZED ANXIETY DISORDER WITH PANIC ATTACKS: ICD-10-CM

## 2021-12-06 DIAGNOSIS — Z11.59 NEED FOR HEPATITIS C SCREENING TEST: ICD-10-CM

## 2021-12-06 PROCEDURE — 99396 PREV VISIT EST AGE 40-64: CPT | Performed by: FAMILY MEDICINE

## 2021-12-06 PROCEDURE — 99214 OFFICE O/P EST MOD 30 MIN: CPT | Performed by: FAMILY MEDICINE

## 2021-12-06 RX ORDER — OMEPRAZOLE 40 MG/1
40 CAPSULE, DELAYED RELEASE ORAL DAILY
Qty: 30 CAPSULE | Refills: 3 | Status: SHIPPED | OUTPATIENT
Start: 2021-12-06 | End: 2021-12-28 | Stop reason: ALTCHOICE

## 2021-12-06 NOTE — PROGRESS NOTES
Chief Complaint   Patient presents with   [facility] Follow Up     he is a 42y.o. year old male who presents for CPE. Complete Physical Exam Questions:    1. Do you follow a low fat diet?  no  2. Are you up to date on your Tdap (<10 years)? Unknown  3. Have you ever had a Pneumovax vaccine (>65)? Not applicable   [id number] Not applicable   [id number] Not applicable  4. Have you had Zoster vaccine (>60)? Not applicable  5. Have you had the HPV - Gardasil (13- 26)? Not applicable  6. Do you follow an exercise program?  no  7. Do you smoke?  no If > 65 and smoker, have you had a abdominal aortic aneurysm ultrasound screen? No  8. Do you consider yourself overweight?  no  9. Is there a family history of CAD< age 48? No  10. Is there a family history of Cancer? Yes  11. Do you know your Cancer risks? Yes  12. Have you had a colonoscopy? Yes  13. Have you been tested for HIV or other STI's? No HIV today(18-64 y/o)? No   14. Have you had an EKG in the last five years(>50)? No  15. Have you had a PSA test done this year (50-69)? Not applicable    Other complaints:Patient was seen in the hospital for chest pain . Reviewed and agree with Nurse Note and duplicated in this note. Reviewed PmHx, RxHx, FmHx, SocHx, AllgHx and updated and dated in the chart.     Family History   Problem Relation Age of Onset    Thyroid Disease Mother         hyperthyroidism    Diabetes Mother     Seizures Mother     Hypertension Father     Heart Disease Father     Psychiatric Disorder Brother     No Known Problems Brother     Thyroid Disease Maternal Grandmother     Thyroid Disease Maternal Uncle     Cancer Paternal Grandmother         Unknown       Past Medical History:   Diagnosis Date    Chronic anal fissure     Hyperthyroidism     Subclinical.  Followed by primary physician and endocrinologist.    Prostatitis       Social History     Socioeconomic History    Marital status:    Tobacco Use    Smoking status: Never Smoker    Smokeless tobacco: Never Used   Substance and Sexual Activity    Alcohol use: Yes     Comment: 2-4 per week    Drug use: No    Sexual activity: Yes     Partners: Female     Birth control/protection: Surgical        Review of Systems - negative except as listed above      Objective:     Vitals:    12/06/21 1449   BP: 119/77   Pulse: 82   Resp: 16   Temp: 98.3 °F (36.8 °C)   SpO2: 94%   Weight: 180 lb (81.6 kg)   Height: 5' 11\" (1.803 m)       Physical Examination: General appearance - alert, well appearing, and in no distress  Eyes - pupils equal and reactive, extraocular eye movements intact  Ears - bilateral TM's and external ear canals normal  Nose - normal and patent, no erythema, discharge or polyps  Mouth - mucous membranes moist, pharynx normal without lesions  Neck - supple, no significant adenopathy  Chest - clear to auscultation, no wheezes, rales or rhonchi, symmetric air entry  Heart - normal rate, regular rhythm, normal S1, S2, no murmurs, rubs, clicks or gallops  Abdomen - soft, nontender, nondistended, no masses or organomegaly  Back exam - full range of motion, no tenderness, palpable spasm or pain on motion  Neurological - alert, oriented, normal speech, no focal findings or movement disorder noted  Musculoskeletal - no joint tenderness, deformity or swelling  Extremities - peripheral pulses normal, no pedal edema, no clubbing or cyanosis  Skin - normal coloration and turgor, no rashes, no suspicious skin lesions noted       Assessment/ Plan:   Diagnoses and all orders for this visit:    1. Visit for [facility] health check  -     CBC W/O DIFF; Future  -     LIPID PANEL; Future  -     METABOLIC PANEL, COMPREHENSIVE; Future    2. Generalized anxiety disorder with panic attacks    3. Gastroesophageal reflux disease, unspecified whether esophagitis present    4. Need for hepatitis C screening test  -     HEPATITIS C AB; Future    5.  Chest pain, unspecified type  -     REFERRAL TO CARDIOLOGY    Other orders  -     omeprazole (PRILOSEC) 40 mg capsule; Take 1 Capsule by mouth daily. Patient follow-up in 4 weeks  Will do a trial of Prilosec if this helps with his chest pain  Patient will be referred to cardiology for second opinion   a diagnosis is GERD versus anxiety    Labs to be drawn: CBC, CMP, Lipid      I have discussed the diagnosis with the patient and the intended plan as seen in the above orders. The patient has received an after-visit summary and questions were answered concerning future plans. Medication Side Effects and Warnings were discussed with patient,  Patient Labs were reviewed and or requested, and  Patient Past Records were reviewed and or requested  yes       Miguel A Matta reports 4 weeks onset of chest discomfort described as sharp in the right  chest.  The discomfort is intermittent (<1 minute). he is not currently having chest discomfort. he denies radiation of discomfort to the left arm. Associated symptoms include: none. Cardiac risk factors include:  none. he has not had a past history of cardiovascular disease and has had recent work ups of chest pain at [facility].

## 2021-12-07 LAB
ALBUMIN SERPL-MCNC: 4.2 G/DL (ref 3.5–5)
ALBUMIN/GLOB SERPL: 1.4 {RATIO} (ref 1.1–2.2)
ALP SERPL-CCNC: 46 U/L (ref 45–117)
ALT SERPL-CCNC: 33 U/L (ref 12–78)
ANION GAP SERPL CALC-SCNC: 2 MMOL/L (ref 5–15)
AST SERPL-CCNC: 21 U/L (ref 15–37)
BILIRUB SERPL-MCNC: 0.3 MG/DL (ref 0.2–1)
BUN SERPL-MCNC: 13 MG/DL (ref 6–20)
BUN/CREAT SERPL: 14 (ref 12–20)
CALCIUM SERPL-MCNC: 9.3 MG/DL (ref 8.5–10.1)
CHLORIDE SERPL-SCNC: 112 MMOL/L (ref 97–108)
CHOLEST SERPL-MCNC: 182 MG/DL
CO2 SERPL-SCNC: 27 MMOL/L (ref 21–32)
CREAT SERPL-MCNC: 0.95 MG/DL (ref 0.7–1.3)
ERYTHROCYTE [DISTWIDTH] IN BLOOD BY AUTOMATED COUNT: 13.1 % (ref 11.5–14.5)
GLOBULIN SER CALC-MCNC: 3 G/DL (ref 2–4)
GLUCOSE SERPL-MCNC: 99 MG/DL (ref 65–100)
HCT VFR BLD AUTO: 45 % (ref 36.6–50.3)
HCV AB SERPL QL IA: NONREACTIVE
HDLC SERPL-MCNC: 59 MG/DL
HDLC SERPL: 3.1 {RATIO} (ref 0–5)
HGB BLD-MCNC: 14.6 G/DL (ref 12.1–17)
LDLC SERPL CALC-MCNC: 100 MG/DL (ref 0–100)
MCH RBC QN AUTO: 29 PG (ref 26–34)
MCHC RBC AUTO-ENTMCNC: 32.4 G/DL (ref 30–36.5)
MCV RBC AUTO: 89.3 FL (ref 80–99)
NRBC # BLD: 0 K/UL (ref 0–0.01)
NRBC BLD-RTO: 0 PER 100 WBC
PLATELET # BLD AUTO: 261 K/UL (ref 150–400)
PMV BLD AUTO: 11.9 FL (ref 8.9–12.9)
POTASSIUM SERPL-SCNC: 4.2 MMOL/L (ref 3.5–5.1)
PROT SERPL-MCNC: 7.2 G/DL (ref 6.4–8.2)
RBC # BLD AUTO: 5.04 M/UL (ref 4.1–5.7)
SODIUM SERPL-SCNC: 141 MMOL/L (ref 136–145)
TRIGL SERPL-MCNC: 115 MG/DL (ref ?–150)
VLDLC SERPL CALC-MCNC: 23 MG/DL
WBC # BLD AUTO: 6 K/UL (ref 4.1–11.1)

## 2021-12-28 ENCOUNTER — OFFICE VISIT (OUTPATIENT)
Dept: CARDIOLOGY CLINIC | Age: 46
End: 2021-12-28
Payer: COMMERCIAL

## 2021-12-28 VITALS
RESPIRATION RATE: 14 BRPM | SYSTOLIC BLOOD PRESSURE: 120 MMHG | DIASTOLIC BLOOD PRESSURE: 80 MMHG | HEART RATE: 68 BPM | WEIGHT: 181 LBS | OXYGEN SATURATION: 98 % | BODY MASS INDEX: 25.34 KG/M2 | HEIGHT: 71 IN

## 2021-12-28 DIAGNOSIS — R07.89 OTHER CHEST PAIN: Primary | ICD-10-CM

## 2021-12-28 PROCEDURE — 99204 OFFICE O/P NEW MOD 45 MIN: CPT | Performed by: SPECIALIST

## 2021-12-28 NOTE — PROGRESS NOTES
HISTORY OF PRESENT ILLNESS  Ro Diallo is a 42 y.o. male     SUMMARY:   Problem List  Date Reviewed: 12/28/2021          Codes Class Noted    Subclinical hyperthyroidism ICD-10-CM: E05.90  ICD-9-CM: 242.90  9/6/2017        TSH deficiency ICD-10-CM: E03.8  ICD-9-CM: 244.8  8/3/2012        Fever of unknown origin (FUO) ICD-10-CM: R50.9  ICD-9-CM: 780.60  3/5/2012        Fatigue ICD-10-CM: R53.83  ICD-9-CM: 780.79  2/20/2012        Acid reflux ICD-10-CM: K21.9  ICD-9-CM: 530.81  2/20/2012              Current Outpatient Medications on File Prior to Visit   Medication Sig    omeprazole (PRILOSEC) 40 mg capsule Take 1 Capsule by mouth daily. (Patient not taking: Reported on 12/28/2021)    escitalopram oxalate (LEXAPRO) 10 mg tablet TAKE 1 TABLET BY MOUTH EVERY DAY (Patient not taking: Reported on 12/6/2021)    escitalopram oxalate (LEXAPRO) 10 mg tablet TAKE 1 TABLET BY MOUTH EVERY DAY (Patient not taking: Reported on 12/6/2021)     No current facility-administered medications on file prior to visit. CARDIOLOGY STUDIES TO DATE:  No specialty comments available. Chief Complaint   Patient presents with    New Patient     HPI :  He is referred by his primary care for cardiac evaluation. A little over a month ago he had about 2 weeks of intermittent right-sided sharp anterior chest discomfort without associated symptoms. He got really worried about it and ended up at the [facility] ER where he had a negative cardiac work-up. He followed up with his PCP and was referred here. The symptoms have largely resolved and along the way he recognize that when he would feel this if he would straighten up or stretch it seems to relieve it. He has started a little more regular exercise recently and has absolutely no exertional cardiac symptoms. There is no history of hypertension or diabetes and is never smoked.   Recent lipid profile looked great and his family history is negative for premature coronary disease. CARDIAC ROS:   negative for dyspnea, palpitations, syncope, orthopnea, paroxysmal nocturnal dyspnea, claudication, lower extremity edema    Family History   Problem Relation Age of Onset    Thyroid Disease Mother         hyperthyroidism    Diabetes Mother     Seizures Mother     Hypertension Father     Heart Disease Father     Psychiatric Disorder Brother     No Known Problems Brother     Thyroid Disease Maternal Grandmother     Thyroid Disease Maternal Uncle     Cancer Paternal Grandmother         Unknown       Past Medical History:   Diagnosis Date    Chronic anal fissure     Hyperthyroidism     Subclinical.  Followed by primary physician and endocrinologist.    Prostatitis        GENERAL ROS:  A comprehensive review of systems was negative except for that written in the HPI. Visit Vitals  /80 (BP 1 Location: Left arm, BP Patient Position: Sitting, BP Cuff Size: Adult)   Pulse 68   Resp 14   Ht 5' 11\" (1.803 m)   Wt 181 lb (82.1 kg)   SpO2 98%   BMI 25.24 kg/m²       Wt Readings from Last 3 Encounters:   12/28/21 181 lb (82.1 kg)   12/06/21 180 lb (81.6 kg)   04/11/19 162 lb 4.8 oz (73.6 kg)            BP Readings from Last 3 Encounters:   12/28/21 120/80   12/06/21 119/77   04/11/19 126/84       PHYSICAL EXAM  General appearance: alert, cooperative, no distress, appears stated age  Neurologic: Alert and oriented X 3  Neck: supple, symmetrical, trachea midline, no adenopathy, no carotid bruit and no JVD  Lungs: clear to auscultation bilaterally  Heart: regular rate and rhythm, S1, S2 normal, no murmur, click, rub or gallop  Abdomen: soft, non-tender.  Bowel sounds normal. No masses,  no organomegaly  Extremities: extremities normal, atraumatic, no cyanosis or edema  Pulses: 2+ and symmetric    Lab Results   Component Value Date/Time    Cholesterol, total 182 12/06/2021 10:00 AM    Cholesterol, total 165 02/20/2012 04:14 PM    HDL Cholesterol 59 12/06/2021 10:00 AM HDL Cholesterol 49 02/20/2012 04:14 PM    LDL, calculated 100 12/06/2021 10:00 AM    Triglyceride 115 12/06/2021 10:00 AM    CHOL/HDL Ratio 3.1 12/06/2021 10:00 AM     ASSESSMENT :      He has no cardiac risk factors and his symptoms are almost certainly musculoskeletal.  At this point I think he needs no further cardiac testing or risk ratification. We talked at length about symptoms that would prompt an urgent return visit here or call to 911. current treatment plan is effective, no change in therapy  lab results and schedule of future lab studies reviewed with patient  reviewed diet, exercise and weight control    Encounter Diagnoses   Name Primary?  Other chest pain Yes     No orders of the defined types were placed in this encounter. Follow-up and Dispositions    · Return if symptoms worsen or fail to improve. Melissa Fleming MD  12/28/2021  Please note that this dictation was completed with [employer], the computer voice recognition software. Quite often unanticipated grammatical, syntax, homophones, and other interpretive errors are inadvertently transcribed by the computer software. Please disregard these errors. Please excuse any errors that have escaped final proofreading. Thank you.

## 2023-04-06 ENCOUNTER — OFFICE VISIT (OUTPATIENT)
Dept: INTERNAL MEDICINE CLINIC | Age: 48
End: 2023-04-06
Payer: COMMERCIAL

## 2023-04-06 PROCEDURE — 99396 PREV VISIT EST AGE 40-64: CPT | Performed by: FAMILY MEDICINE

## 2023-04-06 PROCEDURE — 99214 OFFICE O/P EST MOD 30 MIN: CPT | Performed by: FAMILY MEDICINE

## 2023-04-06 NOTE — PROGRESS NOTES
Chief Complaint   Patient presents with    Complete Physical     Patient is here for a wellness visit. he is a 41y.o. year old male who presents for CPE. Complete Physical Exam Questions:    1. Do you follow a low fat diet?  no  2. Are you up to date on your Tdap (<10 years)? Yes  3. Have you ever had a Pneumovax vaccine (>65)? No   PCV13 No   PPSV23 No  4. Have you had Zoster vaccine (>60)? No  5. Have you had the HPV - Gardasil (13- 26)? No  6. Do you follow an exercise program?  yes  7. Do you smoke?  no If > 65 and smoker, have you had a abdominal aortic aneurysm ultrasound screen? No  8. Do you consider yourself overweight?  no  9. Is there a family history of CAD< age 48? Yes  10. Is there a family history of Cancer? No  11. Do you know your Cancer risks? No  12. Have you had a colonoscopy? Yes  13. Have you been tested for HIV or other STI's? No HIV today(18-64 y/o)? No   14. Have you had an EKG in the last five years(>50)? No  15. Have you had a PSA test done this year (50-69)? No    Other complaints: Anxiety and Testosterone     Reviewed and agree with Nurse Note and duplicated in this note. Reviewed PmHx, RxHx, FmHx, SocHx, AllgHx and updated and dated in the chart.     Family History   Problem Relation Age of Onset    Thyroid Disease Mother         hyperthyroidism    Diabetes Mother     Seizures Mother     Hypertension Father     Heart Disease Father     Psychiatric Disorder Brother     No Known Problems Brother     Thyroid Disease Maternal Grandmother     Thyroid Disease Maternal Uncle     Cancer Paternal Grandmother         Unknown       Past Medical History:   Diagnosis Date    Chronic anal fissure     Hyperthyroidism     Subclinical.  Followed by primary physician and endocrinologist.    Prostatitis       Social History     Socioeconomic History    Marital status:    Tobacco Use    Smoking status: Never    Smokeless tobacco: Never   Substance and Sexual Activity Alcohol use: Yes     Comment: 2-4 per week    Drug use: No    Sexual activity: Yes     Partners: Female     Birth control/protection: Surgical        Review of Systems - negative except as listed above      Objective:     Vitals:    04/06/23 0947   BP: 117/79   Pulse: 67   Resp: 16   Temp: 98.3 °F (36.8 °C)   SpO2: 94%   Weight: 177 lb 1.6 oz (80.3 kg)   Height: 5' 11\" (1.803 m)       Physical Examination: General appearance - alert, well appearing, and in no distress  Eyes - pupils equal and reactive, extraocular eye movements intact  Ears - bilateral TM's and external ear canals normal  Nose - normal and patent, no erythema, discharge or polyps  Mouth - mucous membranes moist, pharynx normal without lesions  Neck - supple, no significant adenopathy  Chest - clear to auscultation, no wheezes, rales or rhonchi, symmetric air entry  Heart - normal rate, regular rhythm, normal S1, S2, no murmurs, rubs, clicks or gallops  Abdomen - soft, nontender, nondistended, no masses or organomegaly  Musculoskeletal - no joint tenderness, deformity or swelling  Extremities - peripheral pulses normal, no pedal edema, no clubbing or cyanosis  Skin - normal coloration and turgor, no rashes, no suspicious skin lesions noted       Assessment/ Plan:   Diagnoses and all orders for this visit:    1. Visit for well man health check  -     CBC W/O DIFF; Future  -     LIPID PANEL; Future  -     METABOLIC PANEL, COMPREHENSIVE; Future    2. Colon cancer screening  -     REFERRAL TO GASTROENTEROLOGY    3. Low energy  -     TSH 3RD GENERATION; Future  -     TESTOSTERONE, FREE & TOTAL; Future    4. Generalized anxiety disorder with panic attacks    Other orders  -     hydrOXYzine HCL (ATARAX) 25 mg tablet; Take 1 Tablet by mouth three (3) times daily as needed for Anxiety for up to 10 days. Labs to be drawn: CBC, CMP, Lipid            I have discussed the diagnosis with the patient and the intended plan as seen in the above orders.   The patient has received an after-visit summary and questions were answered concerning future plans. Medication Side Effects and Warnings were discussed with patient,  Patient Labs were reviewed and or requested, and  Patient Past Records were reviewed and or requested  yes     Chief Complaint   Patient presents with    Complete Physical     Patient is here for a wellness visit. he is a 41y.o. year old male who presents for Anxiety/Depression Review:  Patient is seen for anxiety disorder. Current treatment includes previously tried Lexapro but states that he did not like the feeling of elevated and his emotions being Down. Ongoing symptoms include: palpitations. Patient denies: suicidal ideation, homocidal ideation. Reported side effects from the treatment: none. Reviewed and agree with Nurse Note and duplicated in this note. Reviewed PmHx, RxHx, FmHx, SocHx, AllgHx and updated and dated in the chart.     Family History   Problem Relation Age of Onset    Thyroid Disease Mother         hyperthyroidism    Diabetes Mother     Seizures Mother     Hypertension Father     Heart Disease Father     Psychiatric Disorder Brother     No Known Problems Brother     Thyroid Disease Maternal Grandmother     Thyroid Disease Maternal Uncle     Cancer Paternal Grandmother         Unknown       Past Medical History:   Diagnosis Date    Chronic anal fissure     Hyperthyroidism     Subclinical.  Followed by primary physician and endocrinologist.    Prostatitis       Social History     Socioeconomic History    Marital status:    Tobacco Use    Smoking status: Never    Smokeless tobacco: Never   Substance and Sexual Activity    Alcohol use: Yes     Comment: 2-4 per week    Drug use: No    Sexual activity: Yes     Partners: Female     Birth control/protection: Surgical        Review of Systems - negative except as listed above      Objective:     Vitals:    04/06/23 0947   BP: 117/79   Pulse: 67   Resp: 16   Temp: 98.3 °F (36.8 °C)   SpO2: 94%   Weight: 177 lb 1.6 oz (80.3 kg)   Height: 5' 11\" (1.803 m)       Physical Examination: General appearance - alert, well appearing, and in no distress  Eyes - pupils equal and reactive, extraocular eye movements intact  Ears - bilateral TM's and external ear canals normal  Nose - normal and patent, no erythema, discharge or polyps  Mouth - mucous membranes moist, pharynx normal without lesions  Neck - supple, no significant adenopathy  Chest - clear to auscultation, no wheezes, rales or rhonchi, symmetric air entry  Heart - normal rate, regular rhythm, normal S1, S2, no murmurs, rubs, clicks or gallops  Abdomen - soft, nontender, nondistended, no masses or organomegaly  Musculoskeletal - no joint tenderness, deformity or swelling  Extremities - peripheral pulses normal, no pedal edema, no clubbing or cyanosis  Skin - normal coloration and turgor, no rashes, no suspicious skin lesions noted     Assessment/ Plan:   Diagnoses and all orders for this visit:    1. Visit for [facility] health check  -     CBC W/O DIFF; Future  -     LIPID PANEL; Future  -     METABOLIC PANEL, COMPREHENSIVE; Future    2. Colon cancer screening  -     REFERRAL TO GASTROENTEROLOGY    3. Low energy  -     TSH 3RD GENERATION; Future  -     TESTOSTERONE, FREE & TOTAL; Future    4. Generalized anxiety disorder with panic attacks    Other orders  -     hydrOXYzine HCL (ATARAX) 25 mg tablet; Take 1 Tablet by mouth three (3) times daily as needed for Anxiety for up to 10 days. Patient will try hydroxyzine for anxiety         I have discussed the diagnosis with the patient and the intended plan as seen in the above orders. The patient has received an after-visit summary and questions were answered concerning future plans.      Medication Side Effects and Warnings were discussed with patient,  Patient Labs were reviewed and or requested, and  Patient Past Records were reviewed and or requested  yes       Pt agrees to call or return to clinic and/or go to closest ER with any worsening of symptoms. This may include, but not limited to increased fever (>100.4) with NSAIDS or Tylenol, increased edema, confusion, rash, worsening of presenting symptoms. Please note that this dictation was completed with [employer], the computer voice recognition software. Quite often unanticipated grammatical, syntax, homophones, and other interpretive errors are inadvertently transcribed by the computer software. Please disregard these errors. Please excuse any errors that have escaped final proofreading. Thank you.

## 2023-05-09 ENCOUNTER — PATIENT MESSAGE (OUTPATIENT)
Dept: INTERNAL MEDICINE CLINIC | Age: 48
End: 2023-05-09

## (undated) DEVICE — SURGIFOAM SPNG SZ 100

## (undated) DEVICE — DBD-PACK,LAPAROTOMY,2 REINFORCED GOWNS: Brand: MEDLINE

## (undated) DEVICE — SYR 10ML LUER LOK 1/5ML GRAD --

## (undated) DEVICE — DEVON™ KNEE AND BODY STRAP 60" X 3" (1.5 M X 7.6 CM): Brand: DEVON

## (undated) DEVICE — 3M™ DURAPORE™ SURGICAL TAPE 1538-3, 3 INCH X 10 YARD (7,5CM X 9,1M), 4 ROLLS/BOX: Brand: 3M™ DURAPORE™

## (undated) DEVICE — INFECTION CONTROL KIT SYS

## (undated) DEVICE — TRAY PREP DRY W/ PREM GLV 2 APPL 6 SPNG 2 UNDPD 1 OVERWRAP

## (undated) DEVICE — ROCKER SWITCH PENCIL BLADE ELECTRODE, HOLSTER: Brand: EDGE

## (undated) DEVICE — GLOVE SURG SZ 75 L1212IN FNGR THK138MIL BRN LTX FREE

## (undated) DEVICE — SOLUTION IV 1000ML 0.9% SOD CHL

## (undated) DEVICE — X-RAY SPONGES,16 PLY: Brand: DERMACEA

## (undated) DEVICE — KENDALL SCD EXPRESS SLEEVES, KNEE LENGTH, MEDIUM: Brand: KENDALL SCD

## (undated) DEVICE — SUTURE VCRL SZ 3-0 L27IN ABSRB UD L26MM SH 1/2 CIR J416H

## (undated) DEVICE — REM POLYHESIVE ADULT PATIENT RETURN ELECTRODE: Brand: VALLEYLAB

## (undated) DEVICE — TOWEL SURG W17XL27IN STD BLU COT NONFENESTRATED PREWASHED

## (undated) DEVICE — MASTISOL ADHESIVE LIQ 2/3ML

## (undated) DEVICE — NEEDLE HYPO 25GA L1.5IN BVL ORIENTED ECLIPSE

## (undated) DEVICE — 3M(TM) MEDIPORE(TM) H SOFT CLOTH TAPE 2866: Brand: 3M™ MEDIPORE™

## (undated) DEVICE — OCCLUSIVE GAUZE STRIP,3% BISMUTH TRIBROMOPHENATE IN PETROLATUM BLEND: Brand: XEROFORM

## (undated) DEVICE — SURGICAL PROCEDURE PACK BASIN MAJ SET CUST NO CAUT

## (undated) DEVICE — SOLUTION SCRB 4OZ 10% PVP I POVIDONE IOD TOP PAINT EXIDINE